# Patient Record
Sex: FEMALE | Race: WHITE | URBAN - METROPOLITAN AREA
[De-identification: names, ages, dates, MRNs, and addresses within clinical notes are randomized per-mention and may not be internally consistent; named-entity substitution may affect disease eponyms.]

---

## 2017-07-28 ENCOUNTER — HOSPITAL ENCOUNTER (OUTPATIENT)
Dept: SURGERY | Age: 27
Discharge: HOME | End: 2017-07-28

## 2017-07-28 VITALS
OXYGEN SATURATION: 95 % | TEMPERATURE: 97.2 F | RESPIRATION RATE: 25 BRPM | SYSTOLIC BLOOD PRESSURE: 122 MMHG | DIASTOLIC BLOOD PRESSURE: 75 MMHG | HEART RATE: 81 BPM

## 2017-07-28 RX ORDER — MORPHINE SULFATE 2 MG/ML
2 INJECTION, SOLUTION INTRAMUSCULAR; INTRAVENOUS EVERY 5 MIN PRN
Status: DISCONTINUED | OUTPATIENT
Start: 2017-07-28 | End: 2017-07-30 | Stop reason: HOSPADM

## 2017-07-28 RX ORDER — MEPERIDINE HYDROCHLORIDE 50 MG/ML
12.5 INJECTION INTRAMUSCULAR; INTRAVENOUS; SUBCUTANEOUS EVERY 5 MIN PRN
Status: DISCONTINUED | OUTPATIENT
Start: 2017-07-28 | End: 2017-07-30 | Stop reason: HOSPADM

## 2017-07-28 RX ORDER — OXYCODONE HYDROCHLORIDE AND ACETAMINOPHEN 5; 325 MG/1; MG/1
1 TABLET ORAL PRN
Status: ACTIVE | OUTPATIENT
Start: 2017-07-28 | End: 2017-07-28

## 2017-07-28 RX ORDER — LABETALOL HYDROCHLORIDE 5 MG/ML
5 INJECTION, SOLUTION INTRAVENOUS EVERY 10 MIN PRN
Status: DISCONTINUED | OUTPATIENT
Start: 2017-07-28 | End: 2017-07-30 | Stop reason: HOSPADM

## 2017-07-28 RX ORDER — MORPHINE SULFATE 2 MG/ML
1 INJECTION, SOLUTION INTRAMUSCULAR; INTRAVENOUS EVERY 5 MIN PRN
Status: DISCONTINUED | OUTPATIENT
Start: 2017-07-28 | End: 2017-07-30 | Stop reason: HOSPADM

## 2017-07-28 RX ORDER — DIPHENHYDRAMINE HYDROCHLORIDE 50 MG/ML
12.5 INJECTION INTRAMUSCULAR; INTRAVENOUS
Status: ACTIVE | OUTPATIENT
Start: 2017-07-28 | End: 2017-07-28

## 2017-07-28 RX ORDER — OXYCODONE HYDROCHLORIDE AND ACETAMINOPHEN 5; 325 MG/1; MG/1
2 TABLET ORAL PRN
Status: ACTIVE | OUTPATIENT
Start: 2017-07-28 | End: 2017-07-28

## 2017-07-28 RX ORDER — ONDANSETRON 2 MG/ML
4 INJECTION INTRAMUSCULAR; INTRAVENOUS
Status: ACTIVE | OUTPATIENT
Start: 2017-07-28 | End: 2017-07-28

## 2017-07-28 RX ORDER — PROMETHAZINE HYDROCHLORIDE 25 MG/ML
6.25 INJECTION, SOLUTION INTRAMUSCULAR; INTRAVENOUS
Status: ACTIVE | OUTPATIENT
Start: 2017-07-28 | End: 2017-07-28

## 2017-07-28 RX ORDER — HYDRALAZINE HYDROCHLORIDE 20 MG/ML
5 INJECTION INTRAMUSCULAR; INTRAVENOUS EVERY 10 MIN PRN
Status: DISCONTINUED | OUTPATIENT
Start: 2017-07-28 | End: 2017-07-30 | Stop reason: HOSPADM

## 2017-07-28 RX ADMIN — MORPHINE SULFATE 1 MG: 2 INJECTION, SOLUTION INTRAMUSCULAR; INTRAVENOUS at 18:56

## 2017-07-28 RX ADMIN — Medication 0.5 MG: at 18:45

## 2017-07-28 RX ADMIN — Medication 0.5 MG: at 18:39

## 2017-07-28 RX ADMIN — Medication 0.5 MG: at 18:26

## 2017-07-28 RX ADMIN — Medication 0.5 MG: at 18:32

## 2017-07-28 ASSESSMENT — PAIN SCALES - GENERAL
PAINLEVEL_OUTOF10: 7
PAINLEVEL_OUTOF10: 8
PAINLEVEL_OUTOF10: 7
PAINLEVEL_OUTOF10: 6
PAINLEVEL_OUTOF10: 2
PAINLEVEL_OUTOF10: 8
PAINLEVEL_OUTOF10: 3

## 2017-07-28 ASSESSMENT — PAIN DESCRIPTION - ORIENTATION: ORIENTATION: ANTERIOR

## 2017-07-28 ASSESSMENT — PAIN DESCRIPTION - ONSET: ONSET: ON-GOING

## 2017-07-28 ASSESSMENT — PAIN DESCRIPTION - PROGRESSION: CLINICAL_PROGRESSION: GRADUALLY WORSENING

## 2017-07-28 ASSESSMENT — PAIN DESCRIPTION - LOCATION: LOCATION: ABDOMEN

## 2017-07-28 ASSESSMENT — PAIN DESCRIPTION - PAIN TYPE: TYPE: SURGICAL PAIN

## 2017-07-28 ASSESSMENT — PAIN DESCRIPTION - FREQUENCY: FREQUENCY: CONTINUOUS

## 2017-07-28 ASSESSMENT — PAIN DESCRIPTION - DESCRIPTORS: DESCRIPTORS: CRAMPING

## 2021-12-07 LAB
C. TRACHOMATIS, EXTERNAL RESULT: NEGATIVE
N. GONORRHOEAE, EXTERNAL RESULT: NEGATIVE

## 2022-01-04 LAB
ABO, EXTERNAL RESULT: NORMAL
HEP B, EXTERNAL RESULT: NEGATIVE
HIV, EXTERNAL RESULT: NEGATIVE
RH FACTOR, EXTERNAL RESULT: POSITIVE
RPR, EXTERNAL RESULT: NON REACTIVE
RUBELLA TITER, EXTERNAL RESULT: NORMAL

## 2022-05-09 ENCOUNTER — HOSPITAL ENCOUNTER (OUTPATIENT)
Age: 32
Discharge: HOME | End: 2022-05-09
Attending: OBSTETRICS & GYNECOLOGY | Admitting: OBSTETRICS & GYNECOLOGY
Payer: COMMERCIAL

## 2022-05-09 VITALS
SYSTOLIC BLOOD PRESSURE: 115 MMHG | HEIGHT: 66 IN | BODY MASS INDEX: 41.78 KG/M2 | HEART RATE: 75 BPM | DIASTOLIC BLOOD PRESSURE: 61 MMHG | WEIGHT: 260 LBS

## 2022-05-09 LAB
A/G RATIO: 1.5 (ref 1.1–2.2)
ALBUMIN SERPL-MCNC: 4.2 G/DL (ref 3.4–5)
ALP BLD-CCNC: 108 U/L (ref 40–129)
ALT SERPL-CCNC: 13 U/L (ref 10–40)
ANION GAP SERPL CALCULATED.3IONS-SCNC: 11 MMOL/L (ref 3–16)
AST SERPL-CCNC: 15 U/L (ref 15–37)
BASOPHILS ABSOLUTE: 0 K/UL (ref 0–0.2)
BASOPHILS RELATIVE PERCENT: 0.2 %
BILIRUB SERPL-MCNC: <0.2 MG/DL (ref 0–1)
BILIRUBIN URINE: NEGATIVE
BLOOD, URINE: NEGATIVE
BUN BLDV-MCNC: 7 MG/DL (ref 7–20)
CALCIUM SERPL-MCNC: 11.1 MG/DL (ref 8.3–10.6)
CHLORIDE BLD-SCNC: 103 MMOL/L (ref 99–110)
CLARITY: CLEAR
CO2: 23 MMOL/L (ref 21–32)
COLOR: YELLOW
CREAT SERPL-MCNC: 0.6 MG/DL (ref 0.6–1.1)
CREATININE URINE: 14.7 MG/DL (ref 28–259)
EOSINOPHILS ABSOLUTE: 0.1 K/UL (ref 0–0.6)
EOSINOPHILS RELATIVE PERCENT: 0.8 %
GFR AFRICAN AMERICAN: >60
GFR NON-AFRICAN AMERICAN: >60
GLUCOSE BLD-MCNC: 86 MG/DL (ref 70–99)
GLUCOSE URINE: NEGATIVE MG/DL
HCT VFR BLD CALC: 36.3 % (ref 36–48)
HEMOGLOBIN: 12 G/DL (ref 12–16)
KETONES, URINE: NEGATIVE MG/DL
LEUKOCYTE ESTERASE, URINE: NEGATIVE
LYMPHOCYTES ABSOLUTE: 1.5 K/UL (ref 1–5.1)
LYMPHOCYTES RELATIVE PERCENT: 15.4 %
MCH RBC QN AUTO: 30.3 PG (ref 26–34)
MCHC RBC AUTO-ENTMCNC: 33.1 G/DL (ref 31–36)
MCV RBC AUTO: 91.6 FL (ref 80–100)
MICROSCOPIC EXAMINATION: NORMAL
MONOCYTES ABSOLUTE: 0.5 K/UL (ref 0–1.3)
MONOCYTES RELATIVE PERCENT: 5 %
NEUTROPHILS ABSOLUTE: 7.5 K/UL (ref 1.7–7.7)
NEUTROPHILS RELATIVE PERCENT: 78.6 %
NITRITE, URINE: NEGATIVE
PDW BLD-RTO: 13.6 % (ref 12.4–15.4)
PH UA: 6.5 (ref 5–8)
PLATELET # BLD: 256 K/UL (ref 135–450)
PMV BLD AUTO: 7.9 FL (ref 5–10.5)
POTASSIUM SERPL-SCNC: 4.2 MMOL/L (ref 3.5–5.1)
PROTEIN PROTEIN: <4 MG/DL
PROTEIN UA: NEGATIVE MG/DL
PROTEIN/CREAT RATIO: ABNORMAL MG/DL
RBC # BLD: 3.97 M/UL (ref 4–5.2)
SODIUM BLD-SCNC: 137 MMOL/L (ref 136–145)
SPECIFIC GRAVITY UA: <=1.005 (ref 1–1.03)
TOTAL PROTEIN: 7 G/DL (ref 6.4–8.2)
URIC ACID, SERUM: 4.6 MG/DL (ref 2.6–6)
URINE TYPE: NORMAL
UROBILINOGEN, URINE: 0.2 E.U./DL
WBC # BLD: 9.5 K/UL (ref 4–11)

## 2022-05-09 PROCEDURE — 82570 ASSAY OF URINE CREATININE: CPT

## 2022-05-09 PROCEDURE — 84156 ASSAY OF PROTEIN URINE: CPT

## 2022-05-09 PROCEDURE — 81003 URINALYSIS AUTO W/O SCOPE: CPT

## 2022-05-09 PROCEDURE — 85025 COMPLETE CBC W/AUTO DIFF WBC: CPT

## 2022-05-09 PROCEDURE — 84550 ASSAY OF BLOOD/URIC ACID: CPT

## 2022-05-09 PROCEDURE — 99211 OFF/OP EST MAY X REQ PHY/QHP: CPT

## 2022-05-09 PROCEDURE — 99234 HOSP IP/OBS SM DT SF/LOW 45: CPT | Performed by: OBSTETRICS & GYNECOLOGY

## 2022-05-09 PROCEDURE — 80053 COMPREHEN METABOLIC PANEL: CPT

## 2022-05-09 NOTE — PROGRESS NOTES
34yo  presented to triage from home with c/o elevated B/Ps at home with leg swelling and a headache that was relieved with asprin this morning. Monitors placed, hx obtained, consents signed.

## 2022-05-09 NOTE — H&P
Department of Obstetrics and Gynecology  Labor and Delivery  [facility] Triage Note      SUBJECTIVE:  26 y/o  female at 34 weeks 3 days gestation with [facility] 22 presents to triage for evaluation secondary to elevated blood pressures. Blood pressures at home today were 130's-140's/80'-90's. Patient states this is the first time they have been elevated. Denies headache, vision changes and RUQ pain. Pregnancy is complicated by elevated BMI. Patient denies fever, chills, chest pain, shortness of breath, nausea, vomiting, diarrhea, constipation, dysuria and hematuria. Denies vaginal bleeding, loss of fluid, pelvic pain and contractions. Admits to fetal movement. No Known Allergies  No current facility-administered medications on file prior to encounter. Current Outpatient Medications on File Prior to Encounter   Medication Sig Dispense Refill    Prenatal Vit-Fe Fumarate-FA (PRENATAL VITAMIN) 27-0.8 MG TABS Take 1 capsule by mouth daily 30 tablet 0     History reviewed. No pertinent past medical history. Past Surgical History:   Procedure Laterality Date    DENTAL SURGERY      DILATION AND CURETTAGE OF UTERUS N/A 2017     OB History    Para Term  AB Living   1             SAB IAB Ectopic Molar Multiple Live Births                    # Outcome Date GA Lbr Vimal/2nd Weight Sex Delivery Anes PTL Lv   1 Current              History reviewed. No pertinent family history.        OBJECTIVE    Vitals:  BP (!) 117/59   Pulse 75   LMP 10/15/2021   Vitals:    22 1559 22 1613 22 1628 22 1644   BP: 126/66 128/66 126/69 (!) 117/59   Pulse: 80 75 72 75       CONSTITUTIONAL:  awake, alert, cooperative, no apparent distress, and appears stated age  LUNGS:  No increased work of breathing, good air exchange, clear to auscultation bilaterally, no crackles or wheezing  CARDIOVASCULAR:  normal S1 and S2  ABDOMEN:  soft, non-distended and non-tender  MUSCULOSKELETAL:  full range of motion noted  NEUROLOGIC:  Mental Status Exam:  Level of Alertness:   awake  Orientation:   person, place, time  Memory:   normal  Fund of Knowledge:  normal  Attention/Concentration:  normal  Language:  normal  SKIN:  normal skin color, texture, turgor, no edema    Cervix:      Deferred-asymptomatic             Fetal heart rate:         Baseline Heart Rate: 125's        Accelerations:  present       Decelerations:  absent       Variability:  moderate    Contraction frequency: no contractions        DATA:    Contains abnormal data Protein / Creatinine Ratio, Urine  Order: [id number]   Status: Final result     Visible to patient: Yes (seen)     Next appt: None     0 Result Notes     Ref Range & Units 5/9/22 1530   Protein, Ur <12 mg/dL <4.00    Creatinine, Ur 28.0 - 259.0 mg/dL 14.7 Low     Protein/Creat Ratio mg/dL see below    Comment: Protein/Creatinine Ratio cannot be calculated since Urine Protein   and/or Urine Creatinine is below measurable range. No established reference range.          Urinalysis  Order: [id number]   Status: Final result     Visible to patient: Yes (seen)     Next appt: None     0 Result Notes    Component Ref Range & Units 5/9/22 1530 7/10/17 0500 7/5/17 1654   Color, UA Straw/Yellow Yellow  Yellow  Yellow    Clarity, UA Clear Clear  Clear  CLOUDY Abnormal     Glucose, Ur Negative mg/dL Negative  Negative  Negative    Bilirubin Urine Negative Negative  Negative  Negative    Ketones, Urine Negative mg/dL Negative  Negative  Negative    Specific Gravity, UA 1.005 - 1.030 <=1.005  1.025  >=1.030    Blood, Urine Negative Negative  MODERATE Abnormal   MODERATE Abnormal     pH, UA 5.0 - 8.0 6.5  5.5  5.5    Protein, UA Negative mg/dL Negative  Negative  Negative    Urobilinogen, Urine <2.0 E.U./dL 0.2  0.2  0.2    Nitrite, Urine Negative Negative  Negative  Negative    Leukocyte Esterase, Urine Negative Negative  Negative  Negative    Microscopic Examination  Not Indicated  YES  YES Urine Type  NotGiven  Not Specified  Not Specified    Urine Reflex to Culture   Not Indicated     Resulting Agency  [facility]              Specimen Collected: 05/09/22 15:30 Last Resulted: 05/09/22 15:52        Lab Flowsheet     Order Details     View Encounter     Lab and Collection Details     Routing     Result History            Uric Acid  Order: [id number]   Status: Final result     Visible to patient: Yes (seen)     Next appt: None     0 Result Notes     Ref Range & Units 5/9/22 1530   Uric Acid, Serum 2.6 - 6.0 mg/dL 4.6    Resulting Agency  [facility]         Contains abnormal data Comprehensive Metabolic Panel  Order: [id number]   Status: Final result     Visible to patient: Yes (seen)     Next appt: None     0 Result Notes    Component Ref Range & Units 5/9/22 1530 7/5/17 1654   Sodium 136 - 145 mmol/L 137  139    Potassium 3.5 - 5.1 mmol/L 4.2  4.1    Chloride 99 - 110 mmol/L 103  102    CO2 21 - 32 mmol/L 23  22    Anion Gap 3 - 16 11  15    Glucose 70 - 99 mg/dL 86  88    BUN 7 - 20 mg/dL 7  6 Low     CREATININE 0.6 - 1.1 mg/dL 0.6  <0.5 Low     GFR Non- >60 >60  >60 CM    Comment: >60 mL/min/1.73m2 EGFR, calc. for ages 25 and older using the   MDRD formula (not corrected for weight), is valid for stable   renal function. GFR  >60 >60  >60 CM    Comment: Chronic Kidney Disease: less than 60 ml/min/1.73 sq. m.         Kidney Failure: less than 15 ml/min/1.73 sq.m. Results valid for patients 18 years and older.     Calcium 8.3 - 10.6 mg/dL 11.1 High   9.2    Total Protein 6.4 - 8.2 g/dL 7.0  7.7    Albumin 3.4 - 5.0 g/dL 4.2  4.2    Albumin/Globulin Ratio 1.1 - 2.2 1.5  1.2    Total Bilirubin 0.0 - 1.0 mg/dL <0.2  0.3    Alkaline Phosphatase 40 - 129 U/L 108  53    ALT 10 - 40 U/L 13  37    AST 15 - 37 U/L 15  29 CM    Globulin   3.5 R    Resulting Agency  [facility] [address] [facility]              Specimen Collected: 05/09/22 15:30 Last Resulted: 05/09/22 16:24        Lab Flowsheet     Order Details     View Encounter     Lab and Collection Details     Routing     Result History        CM=Additional comments  R=Reference range differs from displayed range          Result Care Coordination      Patient Communication    Add Comments  Seen Back to Top             Testing Performed By:    [facility]   [address] [address]   Tel: [phone]   : Batsheva Chatman M.D.                Result Information    Flag: Abnormal Abnormal   Status: Final result (Collected: 5/9/2022 15:30) Provider Status: Ordered     Contains abnormal data CBC with Auto Differential  Order: [id number]   Status: Final result     Visible to patient: Yes (seen)     Next appt: None     0 Result Notes    Component Ref Range & Units 5/9/22 1530 7/10/17 0500 7/5/17 1654   WBC 4.0 - 11.0 K/uL 9.5  8.5  8.6    RBC 4.00 - 5.20 M/uL 3.97 Low   4.44  4.76    Hemoglobin 12.0 - 16.0 g/dL 12.0  13.3  14.2    Hematocrit 36.0 - 48.0 % 36.3  39.1  42.5    MCV 80.0 - 100.0 fL 91.6  88.0  89.4    MCH 26.0 - 34.0 pg 30.3  29.9  29.9    MCHC 31.0 - 36.0 g/dL 33.1  33.9  33.5    RDW 12.4 - 15.4 % 13.6  14.4  14.6    Platelets 248 - 735 K/uL 256  237  299    MPV 5.0 - 10.5 fL 7.9  8.5  8.4    Neutrophils % % 78.6  62.3  69.7    Lymphocytes % % 15.4  26.0  19.7    Monocytes % % 5.0  6.0  6.4    Eosinophils % % 0.8  4.2  3.2    Basophils % % 0.2  1.5  1.0    Neutrophils Absolute 1.7 - 7.7 K/uL 7.5  5.3  6.0    Lymphocytes Absolute 1.0 - 5.1 K/uL 1.5  2.2  1.7    Monocytes Absolute 0.0 - 1.3 K/uL 0.5  0.5  0.6    Eosinophils Absolute 0.0 - 0.6 K/uL 0.1  0.4  0.3    Basophils Absolute 0.0 - 0.2 K/uL 0.0  0.1  0.1    [facility]  [address] [facility] [address] [facility]              Specimen Collected: 05/09/22 15:30 Last Resulted: 05/09/22 15:50        Lab Flowsheet     Order Details     View Encounter     Lab and Collection Details     Routing     Result History             Result Care Coordination      Patient Communication    Add Comments  Seen Back to Top             Testing Performed By:    [facility]   [address] [facility] [address]   Tel: [phone]   : Juan David Patterson M.D.                ASSESSMENT & PLAN:    1. Elevated blood pressure at home  2. IUP at 29 weeks 3 days gestation  3. BMI>40    Plan:  Blood pressures and lab evaluation reassuring. Pre-eclampsia precautions  Disposition per Dr. Quin Menjivar. Discussed with Dr. Quin Menjivar. Dee Spear D.O.

## 2022-06-30 LAB — GBS, EXTERNAL RESULT: POSITIVE

## 2022-07-13 ENCOUNTER — HOSPITAL ENCOUNTER (OUTPATIENT)
Age: 32
Discharge: HOME | End: 2022-07-13
Attending: OBSTETRICS & GYNECOLOGY | Admitting: OBSTETRICS & GYNECOLOGY
Payer: COMMERCIAL

## 2022-07-13 VITALS
BODY MASS INDEX: 43.07 KG/M2 | WEIGHT: 268 LBS | TEMPERATURE: 98.8 F | DIASTOLIC BLOOD PRESSURE: 82 MMHG | HEART RATE: 82 BPM | RESPIRATION RATE: 16 BRPM | SYSTOLIC BLOOD PRESSURE: 133 MMHG | HEIGHT: 66 IN

## 2022-07-13 PROCEDURE — 59412 ANTEPARTUM MANIPULATION: CPT

## 2022-07-13 PROCEDURE — 6360000002 HC RX W HCPCS

## 2022-07-13 RX ORDER — TERBUTALINE SULFATE 1 MG/ML
INJECTION, SOLUTION SUBCUTANEOUS
Status: COMPLETED
Start: 2022-07-13 | End: 2022-07-13

## 2022-07-13 RX ORDER — SODIUM CHLORIDE 9 MG/ML
INJECTION, SOLUTION INTRAVENOUS PRN
Status: DISCONTINUED | OUTPATIENT
Start: 2022-07-13 | End: 2022-07-13 | Stop reason: HOSPADM

## 2022-07-13 RX ORDER — SODIUM CHLORIDE 0.9 % (FLUSH) 0.9 %
5-40 SYRINGE (ML) INJECTION PRN
Status: DISCONTINUED | OUTPATIENT
Start: 2022-07-13 | End: 2022-07-13 | Stop reason: HOSPADM

## 2022-07-13 RX ORDER — ACETAMINOPHEN 325 MG/1
650 TABLET ORAL EVERY 4 HOURS PRN
Status: DISCONTINUED | OUTPATIENT
Start: 2022-07-13 | End: 2022-07-13 | Stop reason: HOSPADM

## 2022-07-13 RX ORDER — ASPIRIN 81 MG/1
81 TABLET ORAL DAILY
COMMUNITY
End: 2022-07-31

## 2022-07-13 RX ORDER — SODIUM CHLORIDE 0.9 % (FLUSH) 0.9 %
5-40 SYRINGE (ML) INJECTION EVERY 12 HOURS SCHEDULED
Status: DISCONTINUED | OUTPATIENT
Start: 2022-07-13 | End: 2022-07-13 | Stop reason: HOSPADM

## 2022-07-13 RX ORDER — TERBUTALINE SULFATE 1 MG/ML
0.25 INJECTION, SOLUTION SUBCUTANEOUS ONCE
Status: COMPLETED | OUTPATIENT
Start: 2022-07-13 | End: 2022-07-13

## 2022-07-13 RX ADMIN — TERBUTALINE SULFATE 0.25 MG: 1 INJECTION, SOLUTION SUBCUTANEOUS at 14:29

## 2022-07-13 RX ADMIN — TERBUTALINE SULFATE 0.25 MG: 1 INJECTION SUBCUTANEOUS at 14:29

## 2022-07-13 NOTE — PROCEDURES
[facility]                 Mariya Whitten                                OPERATIVE REPORT    PATIENT NAME: Mitch Gomez                   :        1990  MED REC NO:   [id number]                          ROOM:       TR04  ACCOUNT NO:   [de-identified]                           ADMIT DATE: 2022  PROVIDER:     Gagan Yu MD    DATE OF PROCEDURE:  2022    PREOPERATIVE DIAGNOSIS:  Breech fetus at term. POSTOPERATIVE DIAGNOSIS:  Vertex fetus. PROCEDURE:  External cephalic version, successful. SURGEON:  Gagan Yu MD    ANESTHESIA:  None. BLOOD TYPE:  O+.    INDICATIONS AND CONSENT:  A 27year-old  1 who presents at 38  weeks 5 days for an external cephalic version. Ultrasound documented a  breech fetus in the office. NST was reactive. Procedure was reviewed  with the patient and her partner, side effects, benefits and risks. Consent was obtained. All questions were answered. DESCRIPTION OF PROCEDURE:  The patient presented to labor and delivery  where an NST was performed and was reactive. IV was placed _____ and  labs were drawn. Procedure was again reviewed. Consent was obtained  and all questions were answered. The patient did receive Brethine subcutaneous x1. Under ultrasound  guidance external cephalic version was attempted. Forward roll x 1 was  successful in converting the footling breech fetus to vertex. The  patient tolerated the procedure well. Fetal heart tracing will be  evaluated for 1 hour. Blood type O+.         Brooks Peguero MD    D:2022 14:53:29               T: 2022 14:55:52      [email or web address]  Job#: [id number]     Doc#: [id number]    CC:

## 2022-07-13 NOTE — PROGRESS NOTES
Dr. Gordon Heart at bedside to perform external cephalic version at this time. Patient taken off monitor.

## 2022-07-13 NOTE — PROGRESS NOTES
Successful external cephalic version performed at bedside by Dr. Reji Smith at this time. POOR HYGIENE/UNKEMPT

## 2022-07-13 NOTE — PROGRESS NOTES
Pt verbalized understanding of verbal and written discharge instructions and denies having questions at this time. Pt left OB unit at 1611 ambulatory, undelivered, and in stable condition, accompanied by FOB. Patient is not in active labor.

## 2022-07-13 NOTE — H&P
Running Out of Food in the Last Year: Not on file    Ran Out of Food in the Last Year: Not on file   Transportation Needs:     Lack of Transportation (Medical): Not on file    Lack of Transportation (Non-Medical): Not on file   Physical Activity:     Days of Exercise per Week: Not on file    Minutes of Exercise per Session: Not on file   Stress:     Feeling of Stress : Not on file   Social Connections:     Frequency of Communication with Friends and Family: Not on file    Frequency of Social Gatherings with Friends and Family: Not on file    Attends [employer] Services: Not on file    Active Member of [address] [place] [address] [employer] or Organizations: Not on file    Attends Club or Organization Meetings: Not on file    Marital Status: Not on file   Intimate Partner Violence:     Fear of Current or Ex-Partner: Not on file    Emotionally Abused: Not on file    Physically Abused: Not on file    Sexually Abused: Not on file   Housing Stability:     Unable to Pay for Housing in the Last Year: Not on file    Number of [place] in the Last Year: Not on file    Unstable Housing in the Last Year: Not on file     Family History:   History reviewed. No pertinent family history. Medications Prior to Admission:  Medications Prior to Admission: aspirin 81 MG EC tablet, Take 81 mg by mouth daily  Prenatal Vit-Fe Fumarate-FA (PRENATAL VITAMIN) 27-0.8 MG TABS, Take 1 capsule by mouth daily    REVIEW OF SYSTEMS:    wnl    PHYSICAL EXAM:  Vitals:    07/13/22 1357   BP: 133/82   Pulse: 82   Resp: 16   Weight: 268 lb (121.6 kg)   Height: 5' 6\" (1.676 m)     General appearance:  awake, alert, cooperative, no apparent distress, and appears stated age  Neurologic:  Awake, alert, oriented to name, place and time. Lungs:  No increased work of breathing, good air exchange  Abdomen:  Soft, non tender, gravid, consistent with her gestational age, EFW by Leopold's maneuver was 7#  Fetal heart rate:  Reassuring.   Pelvis:  Adequate pelvis  Cervix: 1cm  Contraction frequency:  none  Membranes:  Intact        ASSESSMENT AND PLAN:    Breech fetus at term admitted for ECV-s/b/r reviewed. Consent to proceed. BT O+  IV, Brethine, Sono guidance.     Geri Lainez

## 2022-07-13 NOTE — BRIEF OP NOTE
Brief Postoperative Note      Patient: Manuel Benson  YOB: 1990  MRN: [id number]    Date of Procedure:     Pre-Op Diagnosis: Breech fetus    Post-Op Diagnosis: Same       Procedure; External cephalic version-successful    Surgeon: LAVELL Hardin MD            Findings: Breech fetus, posterior placenta-gd 2.  AFVI wnl- vertex s/p ECV    Electronically signed by Taryn Higgins MD on 7/13/2022 at 2:51 PM

## 2022-07-13 NOTE — PROGRESS NOTES
Patient presents to triage from the office for external cephalic version d/t breech presentation confirmed on ultrasound in office. Patient reports +FM, denies VB, denies LOF. Consent obtained. 18g IV placed and labs collected.   Will update Dr. Benita Cruz of patient arrival.

## 2022-07-28 ENCOUNTER — APPOINTMENT (OUTPATIENT)
Dept: LABOR AND DELIVERY | Age: 32
End: 2022-07-28
Payer: COMMERCIAL

## 2022-07-28 ENCOUNTER — ANESTHESIA (OUTPATIENT)
Dept: LABOR AND DELIVERY | Age: 32
End: 2022-07-28
Payer: COMMERCIAL

## 2022-07-28 ENCOUNTER — HOSPITAL ENCOUNTER (INPATIENT)
Age: 32
LOS: 3 days | Discharge: HOME | End: 2022-07-31
Attending: STUDENT IN AN ORGANIZED HEALTH CARE EDUCATION/TRAINING PROGRAM | Admitting: STUDENT IN AN ORGANIZED HEALTH CARE EDUCATION/TRAINING PROGRAM
Payer: COMMERCIAL

## 2022-07-28 ENCOUNTER — ANESTHESIA EVENT (OUTPATIENT)
Dept: LABOR AND DELIVERY | Age: 32
End: 2022-07-28
Payer: COMMERCIAL

## 2022-07-28 PROBLEM — Z37.9 NORMAL LABOR: Status: ACTIVE | Noted: 2022-07-28

## 2022-07-28 LAB
ABO/RH: NORMAL
AMPHETAMINE SCREEN, URINE: NORMAL
ANTIBODY SCREEN: NORMAL
BARBITURATE SCREEN URINE: NORMAL
BASOPHILS ABSOLUTE: 0 K/UL (ref 0–0.2)
BASOPHILS RELATIVE PERCENT: 0.5 %
BENZODIAZEPINE SCREEN, URINE: NORMAL
BUPRENORPHINE URINE: NORMAL
CANNABINOID SCREEN URINE: NORMAL
COCAINE METABOLITE SCREEN URINE: NORMAL
EOSINOPHILS ABSOLUTE: 0.1 K/UL (ref 0–0.6)
EOSINOPHILS RELATIVE PERCENT: 0.8 %
HCT VFR BLD CALC: 36.6 % (ref 36–48)
HEMOGLOBIN: 12.3 G/DL (ref 12–16)
LYMPHOCYTES ABSOLUTE: 1.5 K/UL (ref 1–5.1)
LYMPHOCYTES RELATIVE PERCENT: 15 %
Lab: NORMAL
MCH RBC QN AUTO: 30.1 PG (ref 26–34)
MCHC RBC AUTO-ENTMCNC: 33.5 G/DL (ref 31–36)
MCV RBC AUTO: 89.7 FL (ref 80–100)
METHADONE SCREEN, URINE: NORMAL
MONOCYTES ABSOLUTE: 0.4 K/UL (ref 0–1.3)
MONOCYTES RELATIVE PERCENT: 3.7 %
NEUTROPHILS ABSOLUTE: 7.8 K/UL (ref 1.7–7.7)
NEUTROPHILS RELATIVE PERCENT: 80 %
OPIATE SCREEN URINE: NORMAL
OXYCODONE URINE: NORMAL
PDW BLD-RTO: 13.9 % (ref 12.4–15.4)
PH UA: 6
PHENCYCLIDINE SCREEN URINE: NORMAL
PLATELET # BLD: 222 K/UL (ref 135–450)
PMV BLD AUTO: 7.8 FL (ref 5–10.5)
PROPOXYPHENE SCREEN: NORMAL
RBC # BLD: 4.08 M/UL (ref 4–5.2)
TOTAL SYPHILLIS IGG/IGM: NORMAL
WBC # BLD: 9.7 K/UL (ref 4–11)

## 2022-07-28 PROCEDURE — 51701 INSERT BLADDER CATHETER: CPT

## 2022-07-28 PROCEDURE — 1220000000 HC SEMI PRIVATE OB R&B

## 2022-07-28 PROCEDURE — 6360000002 HC RX W HCPCS: Performed by: STUDENT IN AN ORGANIZED HEALTH CARE EDUCATION/TRAINING PROGRAM

## 2022-07-28 PROCEDURE — 85025 COMPLETE CBC W/AUTO DIFF WBC: CPT

## 2022-07-28 PROCEDURE — 6370000000 HC RX 637 (ALT 250 FOR IP): Performed by: STUDENT IN AN ORGANIZED HEALTH CARE EDUCATION/TRAINING PROGRAM

## 2022-07-28 PROCEDURE — 86850 RBC ANTIBODY SCREEN: CPT

## 2022-07-28 PROCEDURE — 80307 DRUG TEST PRSMV CHEM ANLYZR: CPT

## 2022-07-28 PROCEDURE — 86900 BLOOD TYPING SEROLOGIC ABO: CPT

## 2022-07-28 PROCEDURE — 3700000025 EPIDURAL BLOCK: Performed by: ANESTHESIOLOGY

## 2022-07-28 PROCEDURE — 2580000003 HC RX 258: Performed by: STUDENT IN AN ORGANIZED HEALTH CARE EDUCATION/TRAINING PROGRAM

## 2022-07-28 PROCEDURE — 86901 BLOOD TYPING SEROLOGIC RH(D): CPT

## 2022-07-28 PROCEDURE — 2500000003 HC RX 250 WO HCPCS: Performed by: NURSE ANESTHETIST, CERTIFIED REGISTERED

## 2022-07-28 PROCEDURE — 86780 TREPONEMA PALLIDUM: CPT

## 2022-07-28 RX ORDER — SODIUM CHLORIDE, SODIUM LACTATE, POTASSIUM CHLORIDE, CALCIUM CHLORIDE 600; 310; 30; 20 MG/100ML; MG/100ML; MG/100ML; MG/100ML
INJECTION, SOLUTION INTRAVENOUS CONTINUOUS
Status: DISCONTINUED | OUTPATIENT
Start: 2022-07-28 | End: 2022-07-28

## 2022-07-28 RX ORDER — BUTORPHANOL TARTRATE 1 MG/ML
1 INJECTION, SOLUTION INTRAMUSCULAR; INTRAVENOUS
Status: DISCONTINUED | OUTPATIENT
Start: 2022-07-28 | End: 2022-07-29

## 2022-07-28 RX ORDER — SODIUM CHLORIDE 9 MG/ML
25 INJECTION, SOLUTION INTRAVENOUS PRN
Status: DISCONTINUED | OUTPATIENT
Start: 2022-07-28 | End: 2022-07-29

## 2022-07-28 RX ORDER — DIPHENHYDRAMINE HYDROCHLORIDE 50 MG/ML
25 INJECTION INTRAMUSCULAR; INTRAVENOUS EVERY 4 HOURS PRN
Status: DISCONTINUED | OUTPATIENT
Start: 2022-07-28 | End: 2022-07-29

## 2022-07-28 RX ORDER — ONDANSETRON 2 MG/ML
4 INJECTION INTRAMUSCULAR; INTRAVENOUS EVERY 6 HOURS PRN
Status: DISCONTINUED | OUTPATIENT
Start: 2022-07-28 | End: 2022-07-29

## 2022-07-28 RX ORDER — SODIUM CHLORIDE, SODIUM LACTATE, POTASSIUM CHLORIDE, CALCIUM CHLORIDE 600; 310; 30; 20 MG/100ML; MG/100ML; MG/100ML; MG/100ML
INJECTION, SOLUTION INTRAVENOUS CONTINUOUS
Status: DISCONTINUED | OUTPATIENT
Start: 2022-07-28 | End: 2022-07-29

## 2022-07-28 RX ORDER — ACETAMINOPHEN 325 MG/1
650 TABLET ORAL EVERY 4 HOURS PRN
Status: DISCONTINUED | OUTPATIENT
Start: 2022-07-28 | End: 2022-07-29

## 2022-07-28 RX ORDER — SODIUM CHLORIDE 0.9 % (FLUSH) 0.9 %
5-40 SYRINGE (ML) INJECTION EVERY 12 HOURS SCHEDULED
Status: DISCONTINUED | OUTPATIENT
Start: 2022-07-28 | End: 2022-07-29

## 2022-07-28 RX ORDER — SODIUM CHLORIDE, SODIUM LACTATE, POTASSIUM CHLORIDE, AND CALCIUM CHLORIDE .6; .31; .03; .02 G/100ML; G/100ML; G/100ML; G/100ML
1000 INJECTION, SOLUTION INTRAVENOUS PRN
Status: DISCONTINUED | OUTPATIENT
Start: 2022-07-28 | End: 2022-07-29

## 2022-07-28 RX ORDER — SODIUM CHLORIDE, SODIUM LACTATE, POTASSIUM CHLORIDE, AND CALCIUM CHLORIDE .6; .31; .03; .02 G/100ML; G/100ML; G/100ML; G/100ML
500 INJECTION, SOLUTION INTRAVENOUS PRN
Status: DISCONTINUED | OUTPATIENT
Start: 2022-07-28 | End: 2022-07-29

## 2022-07-28 RX ORDER — DOCUSATE SODIUM 100 MG/1
100 CAPSULE, LIQUID FILLED ORAL 2 TIMES DAILY
Status: DISCONTINUED | OUTPATIENT
Start: 2022-07-28 | End: 2022-07-29

## 2022-07-28 RX ORDER — BUPIVACAINE HYDROCHLORIDE 2.5 MG/ML
INJECTION, SOLUTION EPIDURAL; INFILTRATION; INTRACAUDAL PRN
Status: DISCONTINUED | OUTPATIENT
Start: 2022-07-28 | End: 2022-07-29 | Stop reason: SDUPTHER

## 2022-07-28 RX ORDER — CARBOPROST TROMETHAMINE 250 UG/ML
250 INJECTION, SOLUTION INTRAMUSCULAR PRN
Status: DISCONTINUED | OUTPATIENT
Start: 2022-07-28 | End: 2022-07-29

## 2022-07-28 RX ORDER — MISOPROSTOL 100 UG/1
800 TABLET ORAL PRN
Status: DISCONTINUED | OUTPATIENT
Start: 2022-07-28 | End: 2022-07-29

## 2022-07-28 RX ORDER — METHYLERGONOVINE MALEATE 0.2 MG/ML
200 INJECTION INTRAVENOUS PRN
Status: DISCONTINUED | OUTPATIENT
Start: 2022-07-28 | End: 2022-07-29

## 2022-07-28 RX ORDER — SODIUM CHLORIDE 0.9 % (FLUSH) 0.9 %
5-40 SYRINGE (ML) INJECTION PRN
Status: DISCONTINUED | OUTPATIENT
Start: 2022-07-28 | End: 2022-07-29

## 2022-07-28 RX ADMIN — Medication 2.5 MILLION UNITS: at 19:38

## 2022-07-28 RX ADMIN — Medication 1 MILLI-UNITS/MIN: at 15:33

## 2022-07-28 RX ADMIN — Medication 25 MCG: at 09:34

## 2022-07-28 RX ADMIN — Medication 15 ML/HR: at 20:44

## 2022-07-28 RX ADMIN — BUPIVACAINE HYDROCHLORIDE 6 ML: 2.5 INJECTION, SOLUTION EPIDURAL; INFILTRATION; INTRACAUDAL; PERINEURAL at 20:40

## 2022-07-28 RX ADMIN — SODIUM CHLORIDE, POTASSIUM CHLORIDE, SODIUM LACTATE AND CALCIUM CHLORIDE: 600; 310; 30; 20 INJECTION, SOLUTION INTRAVENOUS at 08:50

## 2022-07-28 RX ADMIN — DEXTROSE MONOHYDRATE 5 MILLION UNITS: 5 INJECTION INTRAVENOUS at 15:40

## 2022-07-28 RX ADMIN — SODIUM CHLORIDE, POTASSIUM CHLORIDE, SODIUM LACTATE AND CALCIUM CHLORIDE: 600; 310; 30; 20 INJECTION, SOLUTION INTRAVENOUS at 21:15

## 2022-07-28 RX ADMIN — Medication 2.5 MILLION UNITS: at 23:57

## 2022-07-28 RX ADMIN — SODIUM CHLORIDE, POTASSIUM CHLORIDE, SODIUM LACTATE AND CALCIUM CHLORIDE: 600; 310; 30; 20 INJECTION, SOLUTION INTRAVENOUS at 13:57

## 2022-07-28 ASSESSMENT — LIFESTYLE VARIABLES: SMOKING_STATUS: 1

## 2022-07-28 NOTE — H&P
Department of Obstetrics and Gynecology   Obstetrics History and Physical    CHIEF COMPLAINT:  IOL for postdates    HISTORY OF PRESENT ILLNESS:      The patient is a 27 y.o. female at 38w9d. OB History          2    Para        Term                AB   1    Living             SAB        IAB        Ectopic        Molar        Multiple        Live Births              Obstetric Comments   With [facility]            Patient presents with a chief complaint as above and is being admitted for induction    Estimated Due Date: Estimated Date of Delivery: 22    PRENATAL CARE:    Complicated by: breech presentation s/p successful ECV    PAST OB HISTORY:  OB History          2    Para        Term                AB   1    Living             SAB        IAB        Ectopic        Molar        Multiple        Live Births              Obstetric Comments   With [facility]               Past Medical History:    History reviewed. No pertinent past medical history. Past Surgical History:        Procedure Laterality Date    DENTAL SURGERY      DILATION AND CURETTAGE OF UTERUS N/A 2017     Allergies:  Patient has no known allergies.     Social History:    Social History     Socioeconomic History    Marital status: Single     Spouse name: Not on file    Number of children: Not on file    Years of education: Not on file    Highest education level: Not on file   Occupational History    Not on file   Tobacco Use    Smoking status: Every Day     Packs/day: 1.00     Types: Cigarettes    Smokeless tobacco: Never   Vaping Use    Vaping Use: Never used   Substance and Sexual Activity    Alcohol use: No    Drug use: No    Sexual activity: Yes     Partners: Male   Other Topics Concern    Not on file   Social History Narrative    ** Merged History Encounter **          Social Determinants of Health     Financial Resource Strain: Not on file   Food Insecurity: Not on file   Transportation Needs: Not on file   Physical Activity: Not on file   Stress: Not on file   Social Connections: Not on file   Intimate Partner Violence: Not on file   Housing Stability: Not on file     Family History:   History reviewed. No pertinent family history. Medications Prior to Admission:  Medications Prior to Admission: aspirin 81 MG EC tablet, Take 81 mg by mouth daily  Prenatal Vit-Fe Fumarate-FA (PRENATAL VITAMIN) 27-0.8 MG TABS, Take 1 capsule by mouth daily    REVIEW OF SYSTEMS:    Denies fever, chills, dizziness, CP, SOB, N/V/D, constipation, dysuria, blood in the urine or stool    PHYSICAL EXAM:  Vitals:    07/28/22 0855   SpO2: 100%   Weight: 268 lb (121.6 kg)   Height: 5' 6\" (1.676 m)     General appearance:  awake, alert, cooperative, no apparent distress, and appears stated age  Neurologic:  Awake, alert, oriented to name, place and time.     Lungs:  No increased work of breathing, good air exchange  Abdomen:  Soft, non tender, gravid, consistent with her gestational age, EFW by Leopold's maneuver was 3700g   Fetal heart rate:  130 bpm, moderate variability, +accels, - decels  Pelvis:  Adequate pelvis  Cervix: 1-2/50/-3, vertex  Contraction frequency:  none  Membranes:  Intact    Labs: CBC with Differential:    Lab Results   Component Value Date/Time    WBC 9.7 07/28/2022 08:28 AM    RBC 4.08 07/28/2022 08:28 AM    HGB 12.3 07/28/2022 08:28 AM    HCT 36.6 07/28/2022 08:28 AM     07/28/2022 08:28 AM    MCV 89.7 07/28/2022 08:28 AM    MCH 30.1 07/28/2022 08:28 AM    MCHC 33.5 07/28/2022 08:28 AM    RDW 13.9 07/28/2022 08:28 AM    LYMPHOPCT 15.0 07/28/2022 08:28 AM    MONOPCT 3.7 07/28/2022 08:28 AM    BASOPCT 0.5 07/28/2022 08:28 AM    MONOSABS 0.4 07/28/2022 08:28 AM    LYMPHSABS 1.5 07/28/2022 08:28 AM    EOSABS 0.1 07/28/2022 08:28 AM    BASOSABS 0.0 07/28/2022 08:28 AM     ASSESSMENT AND PLAN:    Labor: Admit, anticipate normal delivery, routine labor orders  Fetus: Reassuring  GBS: Yes  Other: IV hydration and antiemetics, IV antibiotic therapy, plan cytotec for cervical ripening and labor induction, R, B, A and possible complications discussed    Raya Spears MD

## 2022-07-29 PROCEDURE — 0HQ9XZZ REPAIR PERINEUM SKIN, EXTERNAL APPROACH: ICD-10-PCS | Performed by: OBSTETRICS & GYNECOLOGY

## 2022-07-29 PROCEDURE — 2580000003 HC RX 258: Performed by: OBSTETRICS & GYNECOLOGY

## 2022-07-29 PROCEDURE — 2500000003 HC RX 250 WO HCPCS: Performed by: NURSE ANESTHETIST, CERTIFIED REGISTERED

## 2022-07-29 PROCEDURE — 10907ZC DRAINAGE OF AMNIOTIC FLUID, THERAPEUTIC FROM PRODUCTS OF CONCEPTION, VIA NATURAL OR ARTIFICIAL OPENING: ICD-10-PCS | Performed by: OBSTETRICS & GYNECOLOGY

## 2022-07-29 PROCEDURE — 7200000001 HC VAGINAL DELIVERY

## 2022-07-29 PROCEDURE — 51701 INSERT BLADDER CATHETER: CPT

## 2022-07-29 PROCEDURE — 6370000000 HC RX 637 (ALT 250 FOR IP): Performed by: OBSTETRICS & GYNECOLOGY

## 2022-07-29 PROCEDURE — 2580000003 HC RX 258: Performed by: STUDENT IN AN ORGANIZED HEALTH CARE EDUCATION/TRAINING PROGRAM

## 2022-07-29 PROCEDURE — 6360000002 HC RX W HCPCS: Performed by: STUDENT IN AN ORGANIZED HEALTH CARE EDUCATION/TRAINING PROGRAM

## 2022-07-29 PROCEDURE — 3E0P7VZ INTRODUCTION OF HORMONE INTO FEMALE REPRODUCTIVE, VIA NATURAL OR ARTIFICIAL OPENING: ICD-10-PCS | Performed by: OBSTETRICS & GYNECOLOGY

## 2022-07-29 PROCEDURE — 1220000000 HC SEMI PRIVATE OB R&B

## 2022-07-29 PROCEDURE — 0UQMXZZ REPAIR VULVA, EXTERNAL APPROACH: ICD-10-PCS | Performed by: OBSTETRICS & GYNECOLOGY

## 2022-07-29 RX ORDER — LANOLIN 100 %
OINTMENT (GRAM) TOPICAL PRN
Status: DISCONTINUED | OUTPATIENT
Start: 2022-07-29 | End: 2022-07-31 | Stop reason: HOSPADM

## 2022-07-29 RX ORDER — SIMETHICONE 80 MG
80 TABLET,CHEWABLE ORAL EVERY 6 HOURS PRN
Status: DISCONTINUED | OUTPATIENT
Start: 2022-07-29 | End: 2022-07-31 | Stop reason: HOSPADM

## 2022-07-29 RX ORDER — SODIUM CHLORIDE 0.9 % (FLUSH) 0.9 %
5-40 SYRINGE (ML) INJECTION PRN
Status: DISCONTINUED | OUTPATIENT
Start: 2022-07-29 | End: 2022-07-31 | Stop reason: HOSPADM

## 2022-07-29 RX ORDER — SODIUM CHLORIDE 0.9 % (FLUSH) 0.9 %
5-40 SYRINGE (ML) INJECTION EVERY 12 HOURS SCHEDULED
Status: DISCONTINUED | OUTPATIENT
Start: 2022-07-29 | End: 2022-07-31 | Stop reason: HOSPADM

## 2022-07-29 RX ORDER — FERROUS SULFATE 325(65) MG
325 TABLET ORAL 2 TIMES DAILY WITH MEALS
Status: DISCONTINUED | OUTPATIENT
Start: 2022-07-29 | End: 2022-07-31 | Stop reason: HOSPADM

## 2022-07-29 RX ORDER — SODIUM CHLORIDE 9 MG/ML
INJECTION, SOLUTION INTRAVENOUS PRN
Status: DISCONTINUED | OUTPATIENT
Start: 2022-07-29 | End: 2022-07-31 | Stop reason: HOSPADM

## 2022-07-29 RX ORDER — OXYCODONE HYDROCHLORIDE 5 MG/1
5 TABLET ORAL EVERY 4 HOURS PRN
Status: DISCONTINUED | OUTPATIENT
Start: 2022-07-29 | End: 2022-07-31 | Stop reason: HOSPADM

## 2022-07-29 RX ORDER — IBUPROFEN 800 MG/1
800 TABLET ORAL EVERY 8 HOURS PRN
Status: DISCONTINUED | OUTPATIENT
Start: 2022-07-29 | End: 2022-07-31 | Stop reason: HOSPADM

## 2022-07-29 RX ORDER — BUPIVACAINE HYDROCHLORIDE 5 MG/ML
INJECTION, SOLUTION EPIDURAL; INTRACAUDAL PRN
Status: DISCONTINUED | OUTPATIENT
Start: 2022-07-29 | End: 2022-07-29 | Stop reason: SDUPTHER

## 2022-07-29 RX ORDER — SODIUM CHLORIDE, SODIUM LACTATE, POTASSIUM CHLORIDE, CALCIUM CHLORIDE 600; 310; 30; 20 MG/100ML; MG/100ML; MG/100ML; MG/100ML
INJECTION, SOLUTION INTRAVENOUS CONTINUOUS
Status: DISCONTINUED | OUTPATIENT
Start: 2022-07-29 | End: 2022-07-31 | Stop reason: HOSPADM

## 2022-07-29 RX ORDER — ACETAMINOPHEN 500 MG
1000 TABLET ORAL EVERY 8 HOURS PRN
Status: DISCONTINUED | OUTPATIENT
Start: 2022-07-29 | End: 2022-07-31 | Stop reason: HOSPADM

## 2022-07-29 RX ORDER — DOCUSATE SODIUM 100 MG/1
100 CAPSULE, LIQUID FILLED ORAL 2 TIMES DAILY PRN
Status: DISCONTINUED | OUTPATIENT
Start: 2022-07-29 | End: 2022-07-31 | Stop reason: HOSPADM

## 2022-07-29 RX ORDER — ONDANSETRON 2 MG/ML
4 INJECTION INTRAMUSCULAR; INTRAVENOUS EVERY 6 HOURS PRN
Status: DISCONTINUED | OUTPATIENT
Start: 2022-07-29 | End: 2022-07-31 | Stop reason: HOSPADM

## 2022-07-29 RX ADMIN — Medication 2.5 MILLION UNITS: at 11:16

## 2022-07-29 RX ADMIN — BENZOCAINE AND LEVOMENTHOL: 200; 5 SPRAY TOPICAL at 15:04

## 2022-07-29 RX ADMIN — DOCUSATE SODIUM 100 MG: 100 CAPSULE, LIQUID FILLED ORAL at 20:48

## 2022-07-29 RX ADMIN — SODIUM CHLORIDE, POTASSIUM CHLORIDE, SODIUM LACTATE AND CALCIUM CHLORIDE: 600; 310; 30; 20 INJECTION, SOLUTION INTRAVENOUS at 03:22

## 2022-07-29 RX ADMIN — Medication 2.5 MILLION UNITS: at 07:45

## 2022-07-29 RX ADMIN — BUPIVACAINE HYDROCHLORIDE 5 ML: 5 INJECTION, SOLUTION EPIDURAL; INTRACAUDAL; PERINEURAL at 05:27

## 2022-07-29 RX ADMIN — ACETAMINOPHEN 1000 MG: 500 TABLET ORAL at 15:04

## 2022-07-29 RX ADMIN — BUPIVACAINE HYDROCHLORIDE 5 ML: 2.5 INJECTION, SOLUTION EPIDURAL; INFILTRATION; INTRACAUDAL; PERINEURAL at 05:27

## 2022-07-29 RX ADMIN — ACETAMINOPHEN 1000 MG: 500 TABLET ORAL at 23:26

## 2022-07-29 RX ADMIN — Medication 2.5 MILLION UNITS: at 03:25

## 2022-07-29 RX ADMIN — WITCH HAZEL 40 EACH: 500 SOLUTION RECTAL; TOPICAL at 15:03

## 2022-07-29 RX ADMIN — ONDANSETRON 4 MG: 2 INJECTION INTRAMUSCULAR; INTRAVENOUS at 08:09

## 2022-07-29 RX ADMIN — SODIUM CHLORIDE, POTASSIUM CHLORIDE, SODIUM LACTATE AND CALCIUM CHLORIDE: 600; 310; 30; 20 INJECTION, SOLUTION INTRAVENOUS at 10:30

## 2022-07-29 RX ADMIN — Medication 10 ML: at 20:43

## 2022-07-29 RX ADMIN — IBUPROFEN 800 MG: 800 TABLET, FILM COATED ORAL at 20:43

## 2022-07-29 ASSESSMENT — PAIN SCALES - GENERAL
PAINLEVEL_OUTOF10: 4
PAINLEVEL_OUTOF10: 0

## 2022-07-29 NOTE — PROGRESS NOTES
Department of Obstetrics and Gynecology  Labor and Delivery   Attending Progress Note      SUBJECTIVE:  Reports she is comfortable with epidural in place    OBJECTIVE:      Fetal heart rate:       Baseline Heart Rate:  130        Accelerations:  present       Long Term Variability:  moderate       Decelerations:  absent         Contraction frequency: 2 minutes    Membranes:  Ruptured clear fluid    Cervix:         Dilation:  4 cm         Effacement:  70         Station:  -2   [facility] at 10    ASSESSMENT & PLAN:  26 yo  at 40w6d admitted for IOL due to postdates    - s/p AROM clear  - s/p epidural  -  continue pitocin augmentation per protocol  - expecting vaginal delivery    Brent Yeh MD

## 2022-07-29 NOTE — ANESTHESIA PROCEDURE NOTES
Epidural Block    Patient location during procedure: OB  Start time: 7/28/2022 8:25 PM  End time: 7/28/2022 8:44 PM  Reason for block: labor epidural  Staffing  Performed: resident/CRNA   Resident/CRNA: RADHIKA Lyon CRNA  Epidural  Patient position: sitting  Prep: ChloraPrep  Patient monitoring: continuous pulse ox  Approach: midline  Location: L3-4  Injection technique: DAVID saline  Provider prep: mask  Needle  Needle type: Tuohy   Needle gauge: 17 G  Needle insertion depth: 9 cm  Catheter type: side hole  Catheter size: 19 G  Catheter at skin depth: 14 cm  Test dose: negativeCatheter Secured: tegaderm and tape  Assessment  Sensory level: T8  Hemodynamics: stable  Attempts: 1  Outcomes: uncomplicated and patient tolerated procedure well  Additional Notes  Sitting, Sterile prep/drape, 1%Xylo at L3-4, 17ga Tuohy with DAVID, 25ga Pencan for w/+CSF for DPE, Pencan removed, Catheter inserted, negative test dose, sterile dressing applied.    Preanesthetic Checklist  Completed: patient identified, IV checked, site marked, risks and benefits discussed, surgical/procedural consents, equipment checked, pre-op evaluation, timeout performed, anesthesia consent given, oxygen available and monitors applied/VS acknowledged

## 2022-07-29 NOTE — PLAN OF CARE
Problem: Pain  Goal: Verbalizes/displays adequate comfort level or baseline comfort level  Outcome: Progressing     Problem: Vaginal Birth or  Section  Goal: Fetal and maternal status remain reassuring during the birth process  Outcome: Progressing     Problem: Postpartum  Goal: Experiences normal postpartum course  Outcome: Progressing  Goal: Appropriate maternal -  bonding  Outcome: Progressing  Goal: Establishment of infant feeding pattern  Outcome: Progressing  Goal: Incisions, wounds, or drain sites healing without S/S of infection  Outcome: Progressing     Problem: Infection - Adult  Goal: Absence of infection at discharge  Outcome: Progressing  Goal: Absence of infection during hospitalization  Outcome: Progressing  Goal: Absence of fever/infection during anticipated neutropenic period  Outcome: Progressing     Problem: Safety - Adult  Goal: Free from fall injury  Outcome: Progressing     Problem: Discharge Planning  Goal: Discharge to home or other facility with appropriate resources  Outcome: Progressing     Problem: Chronic Conditions and Co-morbidities  Goal: Patient's chronic conditions and co-morbidity symptoms are monitored and maintained or improved  Outcome: Progressing

## 2022-07-29 NOTE — PROGRESS NOTES
Department of Obstetrics and Gynecology  Labor and Delivery   Attending Progress Note      SUBJECTIVE:  Patient is asleep.     OBJECTIVE:      Fetal heart rate:       Baseline Heart Rate:  120        Accelerations:  present       Long Term Variability:  moderate       Decelerations: early decels        Contraction frequency: 2 minutes    Membranes:  Ruptured clear fluid    Cervix:         Dilation:  4-5 cm         Effacement:  70         Station:  -2   [facility] at 12    ASSESSMENT & PLAN:  26 yo  at 40w6d admitted for IOL due to postdates    - recommend IUPC placement to better track contractions and contraction strength; patient amenable s/p IUPC placemnt  - s/p AROM clear  - s/p epidural  -  continue pitocin augmentation per protocol  - expecting vaginal delivery    Katie Hernandez MD

## 2022-07-29 NOTE — LACTATION NOTE
This note was copied from a baby's chart. Lactation Progress Note      Data:     RN requesting [facility] assistance with first breast feed after delivery. Mob is a primip. Action: Assisted with good position skin to skin at breast. Baby rooting and a good deep latch was achieved with SRS and AS. Breast feeding education initiated. Encouraged to allow baby to go to breast ad isaiah and stressed the importance of always achieving a good deep comfortable latch. Offered f/u LC support prn. Discouraged paci, bottles and pumping for the first few weeks. Encouraged good hydration and nutrition. [facility] number on board for f/u. Response: Pleased with first breast feed. Verbalized and demonstrated understanding but will need f/u.

## 2022-07-29 NOTE — L&D DELIVERY NOTE
Department of Obstetrics and Gynecology  Spontaneous Vaginal Delivery Note    Labor & Delivery Summary  Dilation Complete Date: 22  Dilation Complete Time: 1110    Pre-operative Diagnosis:  32yo  at [address] 2/ pED    Post-operative Diagnosis:  Living  infant(s) and Male    Procedure:  Spontaneous vaginal delivery    Surgeon:   Roz Machuca CNM      Information for the patient's : Luisa Chaney Burt Ou [[id number]]   APGAR One: N/A    Information for the patient's : Luisa Chaney Burt Ou [[id number]]   APGAR Five: N/A     Information for the patient's : Wayna Duane [[id number]]   Birth Weight: N/A   Apgars 8/9   Anesthesia:  epidural anesthesia    Estimated blood loss:  350ml    Specimen:  Placenta not sent to pathology     Cord blood sent No    Complications:  none    Condition:  infant stable to general nursery and mother stable    Details of Procedure: The patient is a 27 y.o. female at 37w0d   OB History          2    Para        Term                AB   1    Living             SAB        IAB        Ectopic        Molar        Multiple        Live Births              Obstetric Comments   With D&C            who was admitted for induction. She received the following interventions: ARBOW, vaginal Cytotec, and IV Pitocin augmentation She was known to be GBS positive and did receive antibiotic prophylaxis. The patient progressed well,did receive an epidural, became complete and started to push. After pushing for a little under 2 hours the fetal head was at the perineum and a double nuchal noted, one was reduced, then she had a  of a baby boy in ARAMIS position, then the rest of the infant delivered atraumatically, placed on mother abdomen. Cord was clamped and cut after 1 minute delayed cord clamping. The delivery of the placenta was spontaneous.  The perineum and vagina were explored and a first degree laceration and bilateral periurethral tears were repaired in standard fashion.     Ricard Brittle MD

## 2022-07-29 NOTE — PROGRESS NOTES
Department of Obstetrics and Gynecology  Labor and Delivery   Attending Progress Note      SUBJECTIVE:  denies complaints    OBJECTIVE:      Fetal heart rate:       Baseline Heart Rate:  120        Accelerations:  present       Long Term Variability:  moderate       Decelerations: early decels        Contraction frequency: 1-3 minutes    Membranes:  Ruptured clear fluid    Cervix:         Dilation:  6 cm         Effacement:  90         Station:  -1   [facility] at 15    ASSESSMENT & PLAN:  28 yo  at 40w6d admitted for IOL due to postdates    - s/p AROM clear and IUPC  - s/p epidural  -  continue pitocin augmentation per protocol  - expecting vaginal delivery    Kimberly Salas MD

## 2022-07-29 NOTE — CARE COORDINATION
Message received, re: hx of MJ use. Mob's UDS is negative. Mob just delivered this afternoon, baby boy, 22 . Per review of chart, no other social concerns, no concerning psychiatric history. Significant other noted to be present. If there are no other concerns, SW will await cord tox result and follow up at that time if necessary.    Jocelyn BLACK-MYLES

## 2022-07-29 NOTE — PROGRESS NOTES
Pt 10/100/+1 at 1110 , Dr. Dilcia Kurtz aware and on unit. Ok t start pushing. Patient actively pushing at [phone] RN remains in continuous attendance at the bedside. Assessment & evaluation of fetal heart rate ongoing via continuous EFM. Dr. Dilcia Kurtz aware and on unit.

## 2022-07-29 NOTE — ANESTHESIA PRE PROCEDURE
1900 07/28/22 2000   BP: (!) 142/75  (!) 145/78 124/71   Pulse: 82  80 70   Resp: 18 18 18 16   Temp:   36.7 °C (98.1 °F) 36.8 °C (98.2 °F)   TempSrc:   Oral Oral   SpO2:       Weight:       Height:                                                  BP Readings from Last 3 Encounters:   07/28/22 124/71   07/13/22 133/82   05/09/22 115/61       NPO Status:                                                                                 BMI:   Wt Readings from Last 3 Encounters:   07/28/22 268 lb (121.6 kg)   07/13/22 268 lb (121.6 kg)   05/09/22 260 lb (117.9 kg)     Body mass index is 43.26 kg/m². CBC:   Lab Results   Component Value Date/Time    WBC 9.7 07/28/2022 08:28 AM    RBC 4.08 07/28/2022 08:28 AM    HGB 12.3 07/28/2022 08:28 AM    HCT 36.6 07/28/2022 08:28 AM    MCV 89.7 07/28/2022 08:28 AM    RDW 13.9 07/28/2022 08:28 AM     07/28/2022 08:28 AM       CMP:   Lab Results   Component Value Date/Time     05/09/2022 03:30 PM    K 4.2 05/09/2022 03:30 PM     05/09/2022 03:30 PM    CO2 23 05/09/2022 03:30 PM    BUN 7 05/09/2022 03:30 PM    CREATININE 0.6 05/09/2022 03:30 PM    GFRAA >60 05/09/2022 03:30 PM    AGRATIO 1.5 05/09/2022 03:30 PM    LABGLOM >60 05/09/2022 03:30 PM    GLUCOSE 86 05/09/2022 03:30 PM    PROT 7.0 05/09/2022 03:30 PM    CALCIUM 11.1 05/09/2022 03:30 PM    BILITOT <0.2 05/09/2022 03:30 PM    ALKPHOS 108 05/09/2022 03:30 PM    AST 15 05/09/2022 03:30 PM    ALT 13 05/09/2022 03:30 PM       POC Tests: No results for input(s): POCGLU, POCNA, POCK, POCCL, POCBUN, POCHEMO, POCHCT in the last 72 hours.     Coags: No results found for: PROTIME, INR, APTT    HCG (If Applicable):   Lab Results   Component Value Date    PREGTESTUR POSITIVE 07/05/2017        ABGs: No results found for: PHART, PO2ART, JBI7YIZ, TNM1GUE, BEART, D9HIQWPD     Type & Screen (If Applicable):  No results found for: LABABO, LABRH    Drug/Infectious Status (If Applicable):  No results found for: HIV, HEPCAB    COVID-19 Screening (If Applicable): No results found for: COVID19        Anesthesia Evaluation   no history of anesthetic complications:   Airway: Mallampati: III  TM distance: >3 FB   Neck ROM: full  Mouth opening: > = 3 FB   Dental: normal exam         Pulmonary:normal exam    (+) current smoker                           Cardiovascular:Negative CV ROS                      Neuro/Psych:   Negative Neuro/Psych ROS              GI/Hepatic/Renal: Neg GI/Hepatic/Renal ROS            Endo/Other: Negative Endo/Other ROS                    Abdominal:   (+) obese,           Vascular: negative vascular ROS. Other Findings:           Anesthesia Plan      epidural     ASA 3             Anesthetic plan and risks discussed with patient and spouse. Plan discussed with attending.                     RADHIKA Mayberry - CRNA   7/28/2022

## 2022-07-29 NOTE — PROGRESS NOTES
Patient's desires up to BR. Patient assisted to sitting on side of bed and allowed to \"dangle. \"  Patient denies dizziness and lightheadedness. Patient assisted up to x 2 assistants and ambulated without difficulty to BR. Pt unable to void at this time. Pericare taught and demonstration returned. Patient informed on postpartum pericare and instructions given of use of tucks pads and dermaplast spray. Linens and gown changed. Patient assisted back to bed and call light within reach. Patient instructed to call for assistance with next void. Patient verbalized understanding. Pt transferred to room 319.

## 2022-07-29 NOTE — PROGRESS NOTES
Pt comfortable with epidural   Vitals:    07/29/22 0830   BP: (!) 114/54   Pulse: 81   Resp: 16   Temp:    SpO2:      Cat 1 FHTs   SVE 7/90/-1     Pit at 13    Continue pitocin titration      Peri Coburn MD

## 2022-07-30 LAB
HCT VFR BLD CALC: 29.5 % (ref 36–48)
HEMOGLOBIN: 9.7 G/DL (ref 12–16)
MCH RBC QN AUTO: 30.1 PG (ref 26–34)
MCHC RBC AUTO-ENTMCNC: 32.9 G/DL (ref 31–36)
MCV RBC AUTO: 91.4 FL (ref 80–100)
PDW BLD-RTO: 14.6 % (ref 12.4–15.4)
PLATELET # BLD: 195 K/UL (ref 135–450)
PMV BLD AUTO: 7.5 FL (ref 5–10.5)
RBC # BLD: 3.22 M/UL (ref 4–5.2)
WBC # BLD: 12.6 K/UL (ref 4–11)

## 2022-07-30 PROCEDURE — 36415 COLL VENOUS BLD VENIPUNCTURE: CPT

## 2022-07-30 PROCEDURE — 85027 COMPLETE CBC AUTOMATED: CPT

## 2022-07-30 PROCEDURE — 6370000000 HC RX 637 (ALT 250 FOR IP): Performed by: OBSTETRICS & GYNECOLOGY

## 2022-07-30 PROCEDURE — 1220000000 HC SEMI PRIVATE OB R&B

## 2022-07-30 RX ADMIN — IBUPROFEN 800 MG: 800 TABLET, FILM COATED ORAL at 13:21

## 2022-07-30 RX ADMIN — IBUPROFEN 800 MG: 800 TABLET, FILM COATED ORAL at 04:34

## 2022-07-30 RX ADMIN — FERROUS SULFATE TAB 325 MG (65 MG ELEMENTAL FE) 325 MG: 325 (65 FE) TAB at 17:54

## 2022-07-30 RX ADMIN — IBUPROFEN 800 MG: 800 TABLET, FILM COATED ORAL at 20:58

## 2022-07-30 RX ADMIN — ACETAMINOPHEN 1000 MG: 500 TABLET ORAL at 17:53

## 2022-07-30 RX ADMIN — DOCUSATE SODIUM 100 MG: 100 CAPSULE, LIQUID FILLED ORAL at 20:58

## 2022-07-30 RX ADMIN — WITCH HAZEL 40 EACH: 500 SOLUTION RECTAL; TOPICAL at 17:53

## 2022-07-30 RX ADMIN — ACETAMINOPHEN 1000 MG: 500 TABLET ORAL at 07:46

## 2022-07-30 ASSESSMENT — PAIN DESCRIPTION - ORIENTATION
ORIENTATION: LOWER
ORIENTATION: LOWER;MID

## 2022-07-30 ASSESSMENT — PAIN - FUNCTIONAL ASSESSMENT
PAIN_FUNCTIONAL_ASSESSMENT: ACTIVITIES ARE NOT PREVENTED

## 2022-07-30 ASSESSMENT — PAIN SCALES - GENERAL
PAINLEVEL_OUTOF10: 2
PAINLEVEL_OUTOF10: 3
PAINLEVEL_OUTOF10: 4

## 2022-07-30 ASSESSMENT — PAIN DESCRIPTION - LOCATION
LOCATION: ABDOMEN;PERINEUM
LOCATION: ABDOMEN;BACK;VAGINA
LOCATION: ABDOMEN;PERINEUM
LOCATION: PERINEUM
LOCATION: ABDOMEN

## 2022-07-30 ASSESSMENT — PAIN DESCRIPTION - DESCRIPTORS
DESCRIPTORS: CRAMPING
DESCRIPTORS: CRAMPING;ACHING
DESCRIPTORS: CRAMPING;SORE
DESCRIPTORS: SORE
DESCRIPTORS: DISCOMFORT

## 2022-07-30 NOTE — LACTATION NOTE
This note was copied from a baby's chart. Lactation Progress Note      Data:     Follow up consult for primip on DOD with an infant born at 40.5 weeks gestation. MOB calling for consult to help get sleepy baby latched. This infant has a tongue tie. Action: Introduced self to patient as lactation, name and phone number written on white board in room. Assisted MOB get infant into a better position at left breast in football position. Infant was able to latch but would only stay on the breast for a couple suck bursts then come off the breast fussy. Checked infants tongue. Infant is able to move tongue well but struggles to maintain latch when sucking on a gloved finger and chin is pressed downward per the Natanael assessment guide. After several attempts with no latch maintained infant began to get sleepy. Assisted MOB express four large drops of colostrum which were fed to infant. Reviewed with mother what to expect over the next  24-48 hours with infant feedings, infant output, how to know infant is getting enough, the importance of a deep latch and how to achieve it, how to break suction and try again if latch is shallow, normal  behavior, how to wake a sleepy infant to feed, how the breasts work to make milk, protecting milk supply, what to expect with cluster feeding, and breast care. Reviewed with mother how to hand express colostrum. Reviewed infant feeding cues and encouraged mother to allow infant to breast feed on demand anytime feeding cues are shown and if no feeding cues are shown to attempt to wake infant to feed every 2-3 hours. If infant is still too sleepy to latch to hand express colostrum into infants mouth for about ten minutes, then try again in 2-3 hours. After the first day of life to breast feed a minimum of 8-12 times a day per 24 hour period.  Also encouraged mother to avoid giving infant a pacifier, bottle, or pump for at least the first two weeks of life or until breast feeding is well established. Encouraged good hydration, nutrition, and rest, and to keep taking prenatal vitamin while lactating. Encouraged much skin to skin between mother. Breast feeding log reviewed, all questions answered. Mother instructed to call lactation for F/U care as needed. Response: MOB verbalized an understanding of education provided and will call for assistance as needed.

## 2022-07-30 NOTE — LACTATION NOTE
This note was copied from a baby's chart. Lactation Progress Note      Data:  RN requests f/u support to assess latch. LGA infant delivered yesterday at 200 at 39 weeks gestation by . Infant with low glucoses overnight, glucose gel was given x 2 and q3h supplement ordered. Mother has a pump in her room. RN requesting consult to assess latch and feeding. RN concerned about possible poor transfer at the breast related to tongue and lip tie that was appreciated after birth on assessment. Infant is asleep and swaddled on consult, and mother is tearful, worried that infant isn't breast feeding, states that baby has only fed a few times since birth. Action: Introduced self as [facility] on for the day and offered support and assessment of feeding at the breast. Reassurance provided that often babies are sleepy on the first DOL as they recover from birth. Mother agrees to support with latching and assessment of feeding. Encouraged STS contact, mother says with STS baby often pushes the breast out of his mouth and won't latch. Reassurance given of normalcy for infants to use their hands with breast feeding, and educated on the many benefits of STS contact with feedings. Mother unswaddled infant and began attempting to latch. Initial latch on was shallow, mother did note this by saying \"he only has my nipple but that's okay\". Educated mother on the importance of obtaining a deep latch both for mom's comfort and to encourage optimal milk transfer with feeding. Encouraged to break the suction of the latch using finger in corner of infants mouth to break the seal. Mother broke the latch. Coaching provided to encourage deeper latching onto the breast. Infant rooting with wide open mouth, CRYSTAL achieved with SRS, infant appears to have good oral tone while breast feeding with strong SRS and AS observed during the feeding and shown to mother. Mother confirms that the latch is comfortable.  Reassured of CRYSTAL observed with feeding, and reassuring signs of effective feeding and milk transfer LC is observing. Good 20 minute feeding observed. Infant then fell asleep at the breast and without cues. Discussed with mother various methods she may feed supplement including SNS, syringes, and bottles with slow flow nipples. Discussed risks related to bottle feeding including flow and nipple confusion that may lead to refusal of the breast/latching difficulties. RN states Dr. Migdalia Soto would like baby to try taking a bottle with this feeding to evaluate how tongue tie is affecting suck. Mother's brother is at bedside and plans to feed infant with this feeding. Infant given to brother to feed while LC provided instruction on how to set up and use breast pump with initiate/premie+ setting. Assisted with first pumping session, and privacy offered and provided. Reassured mother of normalcy not to collect much with pumping sessions, but educated on the importance to pump regardless of volumes expressed. Explained that colostrum is thick, nutrient rich but concentrated/small in volume, and often difficult for the pump to express. Reassured that as milk transitions and matures it is easier for the pump to express and that baby is often much more efficient at transferring colostrum directly from the breast, but that pumping is necessary for stimulation to the breast while supplementing. Mother collected many large thick drops of colostrum and fed to . Instruction provided on how to adjust pump suction to a comfortable level, encouraging to pump on the highest comfortable setting and may increase or decrease pump suction to mom's comfort. Warned not to pump on an uncomfortable setting and encouraged to decrease suction if causing discomfort. Shown appropriate flange fit, and how to clean pumping equipment. Reviewed collection of breast milk and feeding to infant using any volumes expressed towards supplement order.  Infant took 5 mL's of supplement for mother's brother and unable to get infant to take more. LC attempted to feed supplement, infant without sucking at this time. Attempted oral assessment and digital suck evaluation with gloved finger but infant without sucking at this time and unable to visualize under the tongue. Thin frenulum observed from what LC is able to visualize at this time. Lip tie also, observed. Dr. Joan Wiggins at bedside to evaluate. Mother reports has not seen infant extend tongue beyond lower gumline or lips. MATHEUS then attempted to feed remainder of ordered supplement after Dr. Barker Plunk. An additional 10 mL's fed with encouragement and chin support. Shown mom tips on feeding infant. Offered SNS for next feeding, mother states would like to stick with bottle with slow flow nipple for now. Verbal and written education provided to mother, giving mother tongue and lip tie education packet, educating on what tongue/lip ties are, how tongue tie may or may not effect breast feeding depending on thickness and to what degree the tongue tie restricts the mobility of the tongue. Educated on signs/symptoms to monitor for that may warrant the need for revision including difficulty latching/difficulty breast feeding, long feedings, jaw flutter, painful latch, sore or cracked nipples, poor feedings/poor milk transfer/poor weight gain, and decreased output. Reviewed triple feeding plan, encouraging to breast feed when infant first begins to root and show hunger cues, and every 2-3 hours if baby is sleepy and without cues. Gave tips to wake sleepy baby and encourage latching as needed. Encouraged to call for lactation support with latching as needed. Encouraged to feed supplement after breast feeding per orders q3h using any EBM towards supplement. Encouraged to pump q3h x 15 minutes using the premie+ setting. Cups provided and encouraged hand expression in addition to pumping, educating on benefits and improved expression of milk.  Breast feeding education reviewed on how milk production works, benefits of STS contact and deep latch, breast care, expected  feeding behaviors and sleep cycles, and reassuring signs baby is getting enough at the breast including daily goals for infant feedings, output, and weight trends. Reassurance given of normalcy of sleepy behavior on first DOL as baby recovers from birth. Educated on what to expect with upcoming cluster feeding and normalcy, as well as important role it plays on bringing in and establishing a good milk supply. Encouraged continued lactation support while supplementing. Instructed that baby should have a minimum of 8-12 good feedings after the first DOL. Name and number provided on whiteboard. All questions answered. Encouraged to call for f/u support prn. Response: Verbalized and demonstrated understanding of teaching provided. Pleased with comfortable latch and feeding. Feels comfortable with triple feeding plan and use of pump. Will call for f/u support prn.

## 2022-07-30 NOTE — PROGRESS NOTES
Department of Obstetrics and Gynecology  Labor and Delivery  Attending Post Partum Progress Note      SUBJECTIVE:  Pt without complaints, pain controlled, tolerating po, lochia wnl, currently breast and bottle feeding. OBJECTIVE:      Vitals:  Vitals:    22 1753   BP: 121/74   Pulse: 80   Resp: 18   Temp: 98 °F (36.7 °C)   SpO2: 99%       RRR CTAB  ABDOMEN:  soft, non-distended, non-tender, + BS  FF below umbilicus  EXT: no edema    DATA:    CBC:    Lab Results   Component Value Date/Time    WBC 12.6 2022 07:13 AM    HGB 9.7 2022 07:13 AM    HCT 29.5 2022 07:13 AM     2022 07:13 AM       ASSESSMENT & PLAN:    27 y.o.   OB History          2    Para   1    Term   1            AB   1    Living   1         SAB        IAB        Ectopic        Molar        Multiple   0    Live Births   1          Obstetric Comments   With D&C            s/p  ppd# 1  1. Doing well, continue routine post-partum care. 2.  Anemia of acute blood loss:  PNV + Fe.  3.  Does not desire male infant circumcised.

## 2022-07-30 NOTE — ANESTHESIA POSTPROCEDURE EVALUATION
Department of Anesthesiology  Postprocedure Note    Patient: Zahira Aguilera  MRN: [id number]  YOB: 1990  Date of evaluation: 7/30/2022      Procedure Summary     Date: 07/28/22 Room / Location:     Anesthesia Start: 2025 Anesthesia Stop: 07/29/22 1314    Procedure: Labor Analgesia Diagnosis:     Scheduled Providers:  Responsible Provider: Zafar Clark MD    Anesthesia Type: epidural ASA Status: 3          Anesthesia Type: No value filed. Simone Phase I: Simone Score: 10    Simone Phase II: Simone Score: 10      Anesthesia Post Evaluation    Patient location during evaluation: bedside  Patient participation: complete - patient participated  Level of consciousness: awake and alert  Pain score: 2  Airway patency: patent  Nausea & Vomiting: no nausea  Complications: no  Cardiovascular status: hemodynamically stable  Respiratory status: acceptable and room air  Hydration status: stable  Comments: S/P vaginal delivery with epidural analgesia 7-29-22. No reported or apparent anesthesia complications. VSS.     BP: 110/77, Pain 0-10: Pain Level: 0, Location: 1st degree;     Lab Results   Component Value Date    WBC 12.6 (H) 07/30/2022    HGB 9.7 (L) 07/30/2022    HCT 29.5 (L) 07/30/2022    MCV 91.4 07/30/2022     07/30/2022     \

## 2022-07-31 VITALS
OXYGEN SATURATION: 100 % | WEIGHT: 268 LBS | TEMPERATURE: 98.2 F | HEIGHT: 66 IN | DIASTOLIC BLOOD PRESSURE: 81 MMHG | HEART RATE: 96 BPM | SYSTOLIC BLOOD PRESSURE: 128 MMHG | RESPIRATION RATE: 16 BRPM | BODY MASS INDEX: 43.07 KG/M2

## 2022-07-31 PROCEDURE — 6370000000 HC RX 637 (ALT 250 FOR IP): Performed by: OBSTETRICS & GYNECOLOGY

## 2022-07-31 RX ORDER — IBUPROFEN 800 MG/1
800 TABLET ORAL EVERY 8 HOURS PRN
Qty: 120 TABLET | Refills: 3 | COMMUNITY
Start: 2022-07-31

## 2022-07-31 RX ORDER — FERROUS SULFATE 325(65) MG
325 TABLET ORAL
Qty: 30 TABLET | Refills: 3 | COMMUNITY
Start: 2022-07-31

## 2022-07-31 RX ADMIN — FERROUS SULFATE TAB 325 MG (65 MG ELEMENTAL FE) 325 MG: 325 (65 FE) TAB at 10:50

## 2022-07-31 RX ADMIN — ACETAMINOPHEN 1000 MG: 500 TABLET ORAL at 02:04

## 2022-07-31 RX ADMIN — IBUPROFEN 800 MG: 800 TABLET, FILM COATED ORAL at 13:29

## 2022-07-31 RX ADMIN — DOCUSATE SODIUM 100 MG: 100 CAPSULE, LIQUID FILLED ORAL at 10:50

## 2022-07-31 RX ADMIN — IBUPROFEN 800 MG: 800 TABLET, FILM COATED ORAL at 05:05

## 2022-07-31 RX ADMIN — ACETAMINOPHEN 1000 MG: 500 TABLET ORAL at 10:50

## 2022-07-31 ASSESSMENT — PAIN DESCRIPTION - LOCATION
LOCATION: ABDOMEN;BACK;VAGINA
LOCATION: ABDOMEN;BACK;VAGINA
LOCATION: ABDOMEN;PERINEUM;BACK
LOCATION: PERINEUM

## 2022-07-31 ASSESSMENT — PAIN DESCRIPTION - DESCRIPTORS
DESCRIPTORS: ACHING;CRAMPING
DESCRIPTORS: ACHING;CRAMPING
DESCRIPTORS: SORE

## 2022-07-31 ASSESSMENT — PAIN SCALES - GENERAL
PAINLEVEL_OUTOF10: 2
PAINLEVEL_OUTOF10: 2
PAINLEVEL_OUTOF10: 4
PAINLEVEL_OUTOF10: 3

## 2022-07-31 ASSESSMENT — PAIN - FUNCTIONAL ASSESSMENT
PAIN_FUNCTIONAL_ASSESSMENT: ACTIVITIES ARE NOT PREVENTED
PAIN_FUNCTIONAL_ASSESSMENT: ACTIVITIES ARE NOT PREVENTED

## 2022-07-31 ASSESSMENT — PAIN DESCRIPTION - ORIENTATION
ORIENTATION: LOWER;MID
ORIENTATION: LOWER;MID

## 2022-07-31 NOTE — DISCHARGE SUMMARY
Department of Obstetrics and Gynecology  Postpartum Discharge Summary      Admit Date: 2022    Admit Diagnosis:     Discharge Date: 22    Condition at Discharge: stable    Discharge Diagnoses: spontaneous vaginal delivery    Discharge Disposition:  Home    Service: Obstetrics    Postpartum complications: none     Hospital Course: uncomplicated     Data:  Delivery Date:   2022   1:14 PM     Weight   Information for the patient's : Julieta Arambula, Baby Boy Yvonne [[id number]]   Birth Weight: 9 lb 0.6 oz (4.099 kg)   Apgars   Information for the patient's : Sally Lyle [[id number]]   APGAR One: 8    Apgars   Information for the patient's : Sally Lyle [[id number]]   APGAR Five: 9   Disposition of Baby:  Home with mother    Current Discharge Medication List        START taking these medications    Details   ibuprofen (ADVIL;MOTRIN) 800 MG tablet Take 1 tablet by mouth every 8 hours as needed for Pain  Qty: 120 tablet, Refills: 3      ferrous sulfate (IRON 325) 325 (65 Fe) MG tablet Take 1 tablet by mouth daily (with breakfast)  Qty: 30 tablet, Refills: 3           CONTINUE these medications which have NOT CHANGED    Details   Prenatal Vit-Fe Fumarate-FA (PRENATAL VITAMIN) 27-0.8 MG TABS Take 1 capsule by mouth daily  Qty: 30 tablet, Refills: 0           STOP taking these medications       aspirin 81 MG EC tablet Comments:   Reason for Stopping:              Follow-up: post partum appt in 6 weeks           Electronically signed by Familia Marroquin MD on 2022 at 10:15 AM

## 2022-07-31 NOTE — PLAN OF CARE
Problem: Pain  Goal: Verbalizes/displays adequate comfort level or baseline comfort level  Outcome: Progressing  Flowsheets  Taken 2022 1753 by Joel Sheridan RN  Verbalizes/displays adequate comfort level or baseline comfort level: Encourage patient to monitor pain and request assistance  Taken 2022 1200 by Baldemar Aguirre RN  Verbalizes/displays adequate comfort level or baseline comfort level: Encourage patient to monitor pain and request assistance     Problem: Vaginal Birth or  Section  Goal: Fetal and maternal status remain reassuring during the birth process  Description:  Birth OB-Pregnancy care plan goal which identifies if the fetal and maternal status remain reassuring during the birth process  Outcome: Progressing     Problem: Postpartum  Goal: Experiences normal postpartum course  Description:  Postpartum OB-Pregnancy care plan goal which identifies if the mother is experiencing a normal postpartum course  Outcome: Progressing  Goal: Appropriate maternal -  bonding  Description:  Postpartum OB-Pregnancy care plan goal which identifies if the mother and  are bonding appropriately  Outcome: Progressing  Goal: Establishment of infant feeding pattern  Description:  Postpartum OB-Pregnancy care plan goal which identifies if the mother is establishing a feeding pattern with their   Outcome: Progressing  Goal: Incisions, wounds, or drain sites healing without S/S of infection  Outcome: Progressing  Flowsheets  Taken 2022 by Joel Sheridan RN  Incisions, Wounds, or Drain Sites Healing Without Sign and Symptoms of Infection: TWICE DAILY: Assess and document skin integrity  Taken 2022 1200 by Baldemar Aguirre RN  Incisions, Wounds, or Drain Sites Healing Without Sign and Symptoms of Infection: ADMISSION and DAILY: Assess and document risk factors for pressure ulcer development     Problem: Infection - Adult  Goal: Absence of infection at discharge  Outcome: Progressing  Goal: Absence of infection during hospitalization  Outcome: Progressing  Goal: Absence of fever/infection during anticipated neutropenic period  Outcome: Progressing     Problem: Safety - Adult  Goal: Free from fall injury  Outcome: Progressing     Problem: Discharge Planning  Goal: Discharge to home or other facility with appropriate resources  Outcome: Progressing     Problem: Chronic Conditions and Co-morbidities  Goal: Patient's chronic conditions and co-morbidity symptoms are monitored and maintained or improved  Outcome: Progressing     Problem: Skin/Tissue Integrity  Goal: Absence of new skin breakdown  Description: 1. Monitor for areas of redness and/or skin breakdown  2. Assess vascular access sites hourly  3. Every 4-6 hours minimum:  Change oxygen saturation probe site  4. Every 4-6 hours:  If on nasal continuous positive airway pressure, respiratory therapy assess nares and determine need for appliance change or resting period.   Outcome: Progressing

## 2022-07-31 NOTE — FLOWSHEET NOTE
Discharge Phone Call    Patient Name: [facility] Care Provider: Carmela Barkley MD Discharge Date: 2022    Disposition of baby:    Phone Number: [phone] (home)     Attempts to Contact:  Date:    Caller  Date:    Caller  Date:    Caller    Information for the patient's : Cindia Cabot Boy Jorje Morel [[id number]]   Delivery Method: Vaginal, Spontaneous     1. Now that you are at home is your pain being well controlled? Y/N   If no, instruct to call       provider. 2. Are you breastfeeding? Y/N    Do you need any extra support from our lactation staff? Y/N    If yes, provide number for lactation. 3. Have you made or already had your first appointment with the baby's doctor? Y/N   If no, do      you know when to schedule it? Y/N    4. Have you scheduled your follow-up appointment? Y/N  If no, do you know when to schedule       it? Y/N   If no, they can find it on printed discharge instructions. 5. Did staff discuss safe sleep during your stay? Y/N   6. Did we explain things in a way you could understand? Y/N  7. Were we respectful of your preferences for labor and birth and include you in the plan of       care? Y/N  If no, please explain _______________________________________________  8. Is there anyone in particular you would like to mention who provided care for you? _______      _________________________________________________________________________     9. Were you given a Post-Birth Warning Signs handout? Y/N  Do you have it somewhere      easily accessible? Y/N  If no, please send them a copy and ask them to put it somewhere      easily found. 10. Have you been crying excessively, having anger or mood swings that feel out of control, or       feel like you can't cope with caring for yourself or baby? Y/N   If yes, they may be showing       signs of postpartum depression and should call provider.  There is also a        depression test on page C5 in their discharge booklet they can take. 13. Do you have any other questions or concerns I can address today?  Y/N  ______________      _________________________________________________________________________    Information provided during call :_________________________________________________  ___________________________________________________________________________    Call completed by:____________________________    Date:_________ Time:___________

## 2022-07-31 NOTE — PROGRESS NOTES
Department of Obstetrics and Gynecology  Labor and Delivery  Attending Post Partum Progress Note      SUBJECTIVE:  Pt without complaints, pain controlled, tolerating po, lochia wnl, currently breast and bottle feeding. OBJECTIVE:      Vitals:  Vitals:    22 0200   BP: 124/76   Pulse: 78   Resp: 18   Temp: 98.3 °F (36.8 °C)   SpO2: 100%       RRR CTAB  ABDOMEN:  soft, non-distended, non-tender, + BS  FF below umbilicus  EXT: no edema    DATA:    CBC:    Lab Results   Component Value Date/Time    WBC 12.6 2022 07:13 AM    HGB 9.7 2022 07:13 AM    HCT 29.5 2022 07:13 AM     2022 07:13 AM       ASSESSMENT & PLAN:    27 y.o.   OB History          2    Para   1    Term   1            AB   1    Living   1         SAB        IAB        Ectopic        Molar        Multiple   0    Live Births   1          Obstetric Comments   With D&C            s/p  ppd# 2  1. Doing well, continue routine post-partum care. 2.  Anemia of acute blood loss. 3.  Discharge instructions reviewed.

## 2022-07-31 NOTE — DISCHARGE INSTRUCTIONS
Thank you for the opportunity to care for you and your family. We hope that you are happy with the care we provided during your stay in the [facility]. We want to ensure that you have the help you need when you leave the hospital. If there is anything we can assist you with, please let us know. Breastfeeding mothers may contact our lactation specialists with any problems or questions. [facility] lactation services phone number is [phone]. Leave a message and your call will be returned. Please refer to the information provided in the \"Caring for Yourself\" tab in your discharge binder (Guidelines for [employer]). The following are warning signs to remember. Call 911 if you have:    Chest pain or pressure  Shortness of breath, even at rest  Thoughts of harming yourself or your baby  Seizures    Call your healthcare provider if you have:    Temperature of 100.4 degrees or higher  Stitches that are not healing        -- Swelling, bleeding, drainage, foul odor, redness or warmth in/around your           stitches, staples, or incision (scar)        -- Bad smelling blood or discharge from the vagina  Vaginal bleeding that has increased         -- Soaking through one pad in an hour        -- You are passing clots larger than the size of a lemon  Red, warm tender area(s) in your breast or calf  Headache that does not get better, even after taking medicine; or headache with vision changes    Remember to notify all healthcare providers from your date of delivery to up to one year after giving birth! CARING FOR YOURSELF        DIET/ACTIVITY    Eat a well balanced diet focusing on foods high in fiber and protein. Drink plenty of fluids, especially water. To avoid constipation you may take a mild stool softener as recommended by your doctor or midwife. Gradually increase your activity. Resume an exercise regime only after being advised by your doctor or midwife.   When sitting or lying down, keep your legs elevated to reduce swelling. Avoid lifting anything heavier than your baby or a gallon of milk. Avoid driving for two weeks or while taking narcotics. No sexual intercourse for 6 weeks, or until advised by your OB provider. Nothing in the vagina: intercourse, tampons or douching. Be prepared to discuss family planning at your follow up OB visit. If your feel tired and have a lack of energy, you may continue to take your prenatal vitamins. Nap when your baby naps to catch on sleep. EMOTIONS    You may feel ballard, sad, teary and overwhelmed. Contact your OB provider if you think you may be showing signs of postpartum depression. Please refer to the Kate 1898 tab in your discharge binder. If your baby will not stop crying, contact another adult to help or place the baby in its crib on its back and take a break. Never shake your baby! TAIWO CARE     Vaginal bleeding will decrease in amount over the next few weeks. Cleanse your perineum from front to back using the taiwo-bottle after toileting until bleeding stops. You will notice that as your activity increases, your flow may also increase. This is a sign that you need to rest more often. Call your OB provider if you are saturating more than 1 maxi pad an hour and resting does not help. If used, stiches will dissolve in 4-6 weeks. To ease pain or swelling, use prescribed medications properly or use a sitz bath, if ordered. Kegel exercises will help to restore bladder control. BREAST CARE    FOR BREASTFEEDING MOMS:    If you become engorged, feeding may be more difficult or painful in 1 to 2 days. You may find it helpful to hand express some milk so that the infant can latch on more easily. While breastfeeding continue to take your prenatal vitamins as directed. Refer to the breastfeeding information in the discharge binder.       FOR NON-BREASTFEEDING MOMS:    You may apply ice packs to your breasts over your bra for 20 minutes at a time for comfort. Do not express breast milk. Avoid stimulation to your breasts. When showering, allow the water to strike only your back. Wear a good fitting bra, such as a sports bra, until your milk dries up    [address]    Your abdomen is tender to the touch or you have pain that cannot be relieved. Flu-like symptoms such as achy muscles and joints or you are experiencing extreme weakness or dizziness. Persistent burning or increased urgency in urination. LITERATURE PROVIDED    For Moms and Those Who Care About Them  Your [place] Healthy Start, Grow Smart  Breastfeeding Best for Gurjit Carney, [place] for Mom. [address] Information About [employer]  Controlling pain. I have received the educational material listed above. The [place] [employer] has provided me with the opportunity to view instructional videos pertaining to infant care and the care of myself. I verify that I have received the above information and have no further questions and feel confident to care for myself and my baby. For more information about postpartum care, baby care and feeding, create a [facility] My Chart account, sign in and search using the magnifying glass, typing in postpartum, breast feeding or formula feeding. [email or web address]

## 2022-10-16 ENCOUNTER — HOSPITAL ENCOUNTER (EMERGENCY)
Age: 32
Discharge: HOME | End: 2022-10-16
Attending: EMERGENCY MEDICINE
Payer: COMMERCIAL

## 2022-10-16 VITALS
OXYGEN SATURATION: 100 % | HEIGHT: 66 IN | DIASTOLIC BLOOD PRESSURE: 100 MMHG | RESPIRATION RATE: 18 BRPM | WEIGHT: 250 LBS | SYSTOLIC BLOOD PRESSURE: 150 MMHG | BODY MASS INDEX: 40.18 KG/M2 | TEMPERATURE: 98.6 F | HEART RATE: 78 BPM

## 2022-10-16 DIAGNOSIS — K08.89 PAIN, DENTAL: Primary | ICD-10-CM

## 2022-10-16 PROCEDURE — 99283 EMERGENCY DEPT VISIT LOW MDM: CPT

## 2022-10-16 PROCEDURE — 6370000000 HC RX 637 (ALT 250 FOR IP): Performed by: EMERGENCY MEDICINE

## 2022-10-16 RX ORDER — PENICILLIN V POTASSIUM 500 MG/1
500 TABLET ORAL 4 TIMES DAILY
Qty: 28 TABLET | Refills: 0 | Status: SHIPPED | OUTPATIENT
Start: 2022-10-16 | End: 2022-10-23

## 2022-10-16 RX ORDER — PENICILLIN V POTASSIUM 250 MG/1
500 TABLET ORAL ONCE
Status: COMPLETED | OUTPATIENT
Start: 2022-10-16 | End: 2022-10-16

## 2022-10-16 RX ADMIN — PENICILLIN V POTASSIUM 500 MG: 250 TABLET, FILM COATED ORAL at 05:17

## 2022-10-16 NOTE — ED NOTES
D/C: Order noted for d/c. Pt confirmed d/c paperwork does have correct name. Discharge and education instructions reviewed with patient. Teach-back successful. Pt verbalized understanding and signed d/c papers. Pt denied questions at this time. No acute distress noted. Patient instructed to follow-up as noted - return to emergency department if symptoms worsen. Patient verbalized understanding. Discharged per EDMD with discharge instructions. Pt discharged to private vehicle. Patient stable upon departure. Thanked patient for choosing [facility] for care. Provider aware of patient pain at time of discharge.        Vanessa Altamirano RN  10/16/22 [id number]

## 2022-10-16 NOTE — ED PROVIDER NOTES
Emergency Department Attending Note    Aditya Johnson MD    Date of ED VIsit: 10/16/2022    CHIEF COMPLAINT  Dental Pain (Pt reports dental pain that started last week and became worse tonight. Pt. States that she does have a known broken tooth. )      HISTORY OF PRESENT ILLNESS  Belen Harper is a 27 y.o. female  With Vital signs of BP (!) 150/100   Pulse 78   Temp 98.6 °F (37 °C) (Oral)   Resp 18   Ht 5' 6\" (1.676 m)   Wt 250 lb (113.4 kg)   SpO2 100%   Breastfeeding Yes   BMI 40.35 kg/m²  who presents to the ED with a complaint of tooth pain. Patient seen and evaluated in room 8. Patient complains of pain in the left maxillary molar tooth x1 week she has been using Motrin for pain control. Now she is using cold water for pain control. No fevers she is tolerating p.o.'s. Has not seen a dentist plans on seeing  Sometime this week. No other complaints, modifying factors or associated symptoms. Patients Past medical history reviewed and listed below  History reviewed. No pertinent past medical history. Past Surgical History:   Procedure Laterality Date    DENTAL SURGERY      DILATION AND CURETTAGE OF UTERUS N/A 07/28/2017       I have reviewed the following from the nursing documentation. History reviewed. No pertinent family history.   Social History     Socioeconomic History    Marital status: Single     Spouse name: Not on file    Number of children: Not on file    Years of education: Not on file    Highest education level: Not on file   Occupational History    Not on file   Tobacco Use    Smoking status: Former     Packs/day: 1.00     Types: Cigarettes    Smokeless tobacco: Never   Vaping Use    Vaping Use: Never used   Substance and Sexual Activity    Alcohol use: No    Drug use: No    Sexual activity: Yes     Partners: Male   Other Topics Concern    Not on file   Social History Narrative    ** Merged History Encounter **          Social Determinants of Health     Financial Resource Strain: Not on file   Food Insecurity: Not on file   Transportation Needs: Not on file   Physical Activity: Not on file   Stress: Not on file   Social Connections: Not on file   Intimate Partner Violence: Not on file   Housing Stability: Not on file     Current Facility-Administered Medications   Medication Dose Route Frequency Provider Last Rate Last Admin    penicillin v potassium (VEETID) tablet 500 mg  500 mg Oral Once Brigitte Jones MD         Current Outpatient Medications   Medication Sig Dispense Refill    ibuprofen (ADVIL;MOTRIN) 800 MG tablet Take 1 tablet by mouth every 8 hours as needed for Pain 120 tablet 3    ferrous sulfate (IRON 325) 325 (65 Fe) MG tablet Take 1 tablet by mouth daily (with breakfast) 30 tablet 3    Prenatal Vit-Fe Fumarate-FA (PRENATAL VITAMIN) 27-0.8 MG TABS Take 1 capsule by mouth daily 30 tablet 0     No Known Allergies    REVIEW OF SYSTEMS  10 systems reviewed, pertinent positives per HPI otherwise noted to be negative     PHYSICAL EXAM  BP (!) 150/100   Pulse 78   Temp 98.6 °F (37 °C) (Oral)   Resp 18   Ht 5' 6\" (1.676 m)   Wt 250 lb (113.4 kg)   SpO2 100%   Breastfeeding Yes   BMI 40.35 kg/m²   GENERAL APPEARANCE: Awake and alert. Cooperative. In mild distress. HEAD: Normocephalic. Atraumatic. EYES: PERRL. EOM's grossly intact. ENT: Mucous membranes are pink and moist.  There is no intraoral abscess to drain  NECK: Supple. HEART: RRR. No murmurs. LUNGS: Respirations unlabored. CTAB. Good air exchange. ABDOMEN: Soft. Non-distended. Non-tender. No masses. No organomegaly. No guarding or rebound. EXTREMITIES: No peripheral edema. Moves all extremities equally. All extremities neurovascularly intact. SKIN: Warm and dry. No acute rashes. NEUROLOGICAL: Alert and oriented. . Strength 5/5, sensation intact. Gait normal.   PSYCHIATRIC: Normal mood and affect. No HI or SI expressed to me.       RADIOLOGY    If acquired see below     EKG:     If acquired see below ED COURSE/MDM    Patient will be treated with antibiotic Pen-Vee K and given a prescription for that to follow-up with a dentist         The ED course and plan were reviewed and results discussed with the patient    The patient understood and agreed with the Discharge/transfer planning.     CLINICAL IMPRESSION and DISPOSITION    Jr Durham was stable and diagnosed with tooth pain    Patient was treated with Estefania Verma MD  10/16/22 [id number]